# Patient Record
Sex: FEMALE | Race: OTHER | HISPANIC OR LATINO
[De-identification: names, ages, dates, MRNs, and addresses within clinical notes are randomized per-mention and may not be internally consistent; named-entity substitution may affect disease eponyms.]

---

## 2017-03-06 ENCOUNTER — APPOINTMENT (OUTPATIENT)
Dept: ORTHOPEDIC SURGERY | Facility: CLINIC | Age: 52
End: 2017-03-06

## 2017-03-06 VITALS — WEIGHT: 200 LBS | HEIGHT: 67 IN | RESPIRATION RATE: 16 BRPM | BODY MASS INDEX: 31.39 KG/M2

## 2017-03-06 DIAGNOSIS — M25.50 PAIN IN UNSPECIFIED JOINT: ICD-10-CM

## 2017-03-06 DIAGNOSIS — Z87.898 PERSONAL HISTORY OF OTHER SPECIFIED CONDITIONS: ICD-10-CM

## 2017-03-06 DIAGNOSIS — K31.9 DISEASE OF STOMACH AND DUODENUM, UNSPECIFIED: ICD-10-CM

## 2017-03-06 DIAGNOSIS — Z87.39 PERSONAL HISTORY OF OTHER DISEASES OF THE MUSCULOSKELETAL SYSTEM AND CONNECTIVE TISSUE: ICD-10-CM

## 2017-03-06 DIAGNOSIS — R63.1 POLYDIPSIA: ICD-10-CM

## 2017-03-06 DIAGNOSIS — G56.02 CARPAL TUNNEL SYNDROME, LEFT UPPER LIMB: ICD-10-CM

## 2017-03-06 DIAGNOSIS — M19.90 UNSPECIFIED OSTEOARTHRITIS, UNSPECIFIED SITE: ICD-10-CM

## 2017-03-06 DIAGNOSIS — R20.0 ANESTHESIA OF SKIN: ICD-10-CM

## 2017-03-06 DIAGNOSIS — Z87.891 PERSONAL HISTORY OF NICOTINE DEPENDENCE: ICD-10-CM

## 2017-03-06 RX ORDER — NAPROXEN 500 MG/1
500 TABLET ORAL
Refills: 0 | Status: ACTIVE | COMMUNITY

## 2017-03-16 ENCOUNTER — APPOINTMENT (OUTPATIENT)
Dept: ORTHOPEDIC SURGERY | Facility: AMBULATORY SURGERY CENTER | Age: 52
End: 2017-03-16

## 2017-04-19 ENCOUNTER — APPOINTMENT (OUTPATIENT)
Dept: BARIATRICS | Facility: CLINIC | Age: 52
End: 2017-04-19

## 2017-04-25 RX ORDER — OXYCODONE AND ACETAMINOPHEN 5; 325 MG/1; MG/1
5-325 TABLET ORAL
Qty: 40 | Refills: 0 | Status: ACTIVE | COMMUNITY
Start: 2017-04-25 | End: 1900-01-01

## 2017-04-26 ENCOUNTER — APPOINTMENT (OUTPATIENT)
Dept: BARIATRICS | Facility: CLINIC | Age: 52
End: 2017-04-26

## 2017-04-26 ENCOUNTER — APPOINTMENT (OUTPATIENT)
Dept: ORTHOPEDIC SURGERY | Facility: AMBULATORY SURGERY CENTER | Age: 52
End: 2017-04-26

## 2017-05-03 RX ORDER — OXYCODONE AND ACETAMINOPHEN 5; 325 MG/1; MG/1
5-325 TABLET ORAL
Qty: 40 | Refills: 0 | Status: ACTIVE | COMMUNITY
Start: 2017-05-03 | End: 1900-01-01

## 2017-05-04 ENCOUNTER — APPOINTMENT (OUTPATIENT)
Dept: ORTHOPEDIC SURGERY | Facility: AMBULATORY SURGERY CENTER | Age: 52
End: 2017-05-04

## 2017-05-04 ENCOUNTER — OUTPATIENT (OUTPATIENT)
Dept: OUTPATIENT SERVICES | Facility: HOSPITAL | Age: 52
LOS: 1 days | End: 2017-05-04

## 2017-05-04 DIAGNOSIS — Z90.49 ACQUIRED ABSENCE OF OTHER SPECIFIED PARTS OF DIGESTIVE TRACT: Chronic | ICD-10-CM

## 2017-05-04 DIAGNOSIS — Z98.1 ARTHRODESIS STATUS: Chronic | ICD-10-CM

## 2017-08-10 ENCOUNTER — APPOINTMENT (OUTPATIENT)
Dept: GASTROENTEROLOGY | Facility: CLINIC | Age: 52
End: 2017-08-10

## 2018-03-21 ENCOUNTER — EMERGENCY (EMERGENCY)
Facility: HOSPITAL | Age: 53
LOS: 1 days | Discharge: HOME | End: 2018-03-21
Attending: EMERGENCY MEDICINE | Admitting: EMERGENCY MEDICINE
Payer: COMMERCIAL

## 2018-03-21 VITALS
SYSTOLIC BLOOD PRESSURE: 115 MMHG | DIASTOLIC BLOOD PRESSURE: 79 MMHG | HEART RATE: 75 BPM | OXYGEN SATURATION: 97 % | RESPIRATION RATE: 18 BRPM | WEIGHT: 203.93 LBS | TEMPERATURE: 97 F

## 2018-03-21 DIAGNOSIS — Z79.891 LONG TERM (CURRENT) USE OF OPIATE ANALGESIC: ICD-10-CM

## 2018-03-21 DIAGNOSIS — R07.9 CHEST PAIN, UNSPECIFIED: ICD-10-CM

## 2018-03-21 DIAGNOSIS — Z90.49 ACQUIRED ABSENCE OF OTHER SPECIFIED PARTS OF DIGESTIVE TRACT: Chronic | ICD-10-CM

## 2018-03-21 DIAGNOSIS — M79.601 PAIN IN RIGHT ARM: ICD-10-CM

## 2018-03-21 DIAGNOSIS — Z79.2 LONG TERM (CURRENT) USE OF ANTIBIOTICS: ICD-10-CM

## 2018-03-21 DIAGNOSIS — M79.1 MYALGIA: ICD-10-CM

## 2018-03-21 DIAGNOSIS — Z98.1 ARTHRODESIS STATUS: Chronic | ICD-10-CM

## 2018-03-21 DIAGNOSIS — M79.602 PAIN IN LEFT ARM: ICD-10-CM

## 2018-03-21 PROCEDURE — 93005 ELECTROCARDIOGRAM TRACING: CPT

## 2018-03-21 PROCEDURE — 93010 ELECTROCARDIOGRAM REPORT: CPT

## 2018-03-21 PROCEDURE — 99283 EMERGENCY DEPT VISIT LOW MDM: CPT | Mod: 25

## 2018-03-21 PROCEDURE — 71046 X-RAY EXAM CHEST 2 VIEWS: CPT

## 2018-03-21 PROCEDURE — 71046 X-RAY EXAM CHEST 2 VIEWS: CPT | Mod: 26

## 2018-03-21 PROCEDURE — 99284 EMERGENCY DEPT VISIT MOD MDM: CPT | Mod: 25

## 2018-03-21 NOTE — ED PROVIDER NOTE - PHYSICAL EXAMINATION
reflexes intact bilateral UE no swelling skin intact , compartments soft + moves extremity upon command pulses 2+ regular RUM nerve intact M/S cap refill brisk skin warm and pink + able to pendulum at shoulders + noted tenderness with biceps use especially

## 2018-03-21 NOTE — ED PROVIDER NOTE - ATTENDING CONTRIBUTION TO CARE
pt visualized by me, key points of case gideon MCCARTY.  53 yo female co one month of bilateral bicep pain. has seen her pmd and has appt with rheum.  no acute findings here.  patient will fu outpatient.

## 2018-03-21 NOTE — ED PROVIDER NOTE - OBJECTIVE STATEMENT
Patient with at least one month bilateral bicep region tenderness and radiation throughout musculature extremities . Denies Cardiac HX + seen by PMD and given NSAID and Neurontin and muscle relaxant though denies much relief Has Rheum upcoming Appt in April. No fever + pain increased with movement and decreased with relaxing Patient admits to caring for 2 year old with 3 story home and admits to lots of lifting

## 2018-03-21 NOTE — ED ADULT TRIAGE NOTE - ARRIVAL INFO ADDITIONAL COMMENTS
pt has had bilateral bicep pain for 1 month that now radiates across her chest and upper back.  seen by PMD who treated her for arthritis.  pt states pain is no better and at the end of the day she can not lift her arms up due to pain.  no sob.  no dizziness.  no n/v.

## 2018-03-21 NOTE — ED ADULT NURSE NOTE - CHPI ED SYMPTOMS NEG
no shortness of breath/no dizziness/no vomiting/no syncope/no fever/no chest pain/no cough/no nausea/no chills/no diaphoresis/no back pain

## 2018-03-21 NOTE — ED PROVIDER NOTE - MEDICAL DECISION MAKING DETAILS
patient with extremity use appreciated primarily and discussed overuse syndromes as well as Rheum appt in place -> no overt emergent process identified warranting further ED work up

## 2018-03-21 NOTE — ED ADULT NURSE NOTE - OBJECTIVE STATEMENT
The patient is a 51 y/o female who came in to ED c/o bilateral arm pain and chest soreness. Pt states that she went to her PMD and was given meloxicam and a muscle relaxant without any relief. Pt was told she has arthritis. Pt reports spinal fusion surgery as well. Pt is aox4, denies any dizziness, nausea or shortness of breath. 12 lead EKG done.

## 2019-10-17 NOTE — ED ADULT TRIAGE NOTE - NS ED TRIAGE AVPU SCALE
Outpatient Medications Marked as Taking for the 10/17/19 encounter (Refill) with Francisca Bower MD   Medication Sig Dispense Refill   • [START ON 10/18/2019] dexmethylpheniDATE (FOCALIN) 5 MG tablet Take 1 tablet by mouth 2 times daily. Do not start before October 18, 2019. 60 tablet 0   • dexmethylpheniDATE (FOCALIN) 5 MG tablet Take 1 tablet by mouth 2 times daily. 60 tablet 0           [facility]  
Refill request for focalin   Last office visit: 09/20/2019  Last refill: This was future filled. Set to release tomorrow.   
Alert-The patient is alert, awake and responds to voice. The patient is oriented to time, place, and person. The triage nurse is able to obtain subjective information.

## 2022-03-03 ENCOUNTER — OFFICE VISIT (OUTPATIENT)
Dept: INTERNAL MEDICINE CLINIC | Facility: CLINIC | Age: 57
End: 2022-03-03
Payer: COMMERCIAL

## 2022-03-03 VITALS
HEIGHT: 67 IN | OXYGEN SATURATION: 99 % | BODY MASS INDEX: 32.33 KG/M2 | HEART RATE: 68 BPM | WEIGHT: 206 LBS | DIASTOLIC BLOOD PRESSURE: 84 MMHG | SYSTOLIC BLOOD PRESSURE: 116 MMHG

## 2022-03-03 DIAGNOSIS — Z13.9 SCREENING DUE: ICD-10-CM

## 2022-03-03 DIAGNOSIS — E78.5 HYPERLIPIDEMIA, UNSPECIFIED HYPERLIPIDEMIA TYPE: ICD-10-CM

## 2022-03-03 DIAGNOSIS — R73.03 PREDIABETES: ICD-10-CM

## 2022-03-03 DIAGNOSIS — Z00.00 ENCOUNTER FOR SUBSEQUENT ANNUAL WELLNESS VISIT (AWV) IN MEDICARE PATIENT: ICD-10-CM

## 2022-03-03 DIAGNOSIS — M79.7 FIBROMYALGIA: Primary | ICD-10-CM

## 2022-03-03 DIAGNOSIS — N76.4 BOIL OF VULVA: ICD-10-CM

## 2022-03-03 PROCEDURE — G0439 PPPS, SUBSEQ VISIT: HCPCS | Performed by: FAMILY MEDICINE

## 2022-03-03 PROCEDURE — 99204 OFFICE O/P NEW MOD 45 MIN: CPT | Performed by: FAMILY MEDICINE

## 2022-03-03 RX ORDER — DULOXETIN HYDROCHLORIDE 30 MG/1
30 CAPSULE, DELAYED RELEASE ORAL DAILY
COMMUNITY
End: 2022-03-03 | Stop reason: SDUPTHER

## 2022-03-03 RX ORDER — SULFAMETHOXAZOLE AND TRIMETHOPRIM 800; 160 MG/1; MG/1
1 TABLET ORAL 2 TIMES DAILY
Qty: 6 TABLET | Refills: 0 | Status: SHIPPED | OUTPATIENT
Start: 2022-03-03 | End: 2022-03-06

## 2022-03-03 RX ORDER — DULOXETIN HYDROCHLORIDE 60 MG/1
60 CAPSULE, DELAYED RELEASE ORAL DAILY
Qty: 60 CAPSULE | Refills: 0 | Status: SHIPPED | OUTPATIENT
Start: 2022-03-03 | End: 2022-04-29

## 2022-03-03 NOTE — PROGRESS NOTES
Assessment and Plan:     Problem List Items Addressed This Visit        Other    Prediabetes    Relevant Orders    HEMOGLOBIN A1C W/ EAG ESTIMATION    Ambulatory Referral to Weight Management    Hyperlipidemia    Relevant Orders    Lipid Panel with Direct LDL reflex    Fibromyalgia - Primary    Relevant Medications    DULoxetine (CYMBALTA) 60 mg delayed release capsule    BMI 32 0-32 9,adult    Relevant Orders    Ambulatory Referral to Weight Management      Other Visit Diagnoses     Screening due        Relevant Orders    Comprehensive metabolic panel    TSH, 3rd generation with Free T4 reflex    Boil of vulva        Relevant Medications    sulfamethoxazole-trimethoprim (BACTRIM DS) 800-160 mg per tablet    Encounter for subsequent annual wellness visit (AWV) in Medicare patient               Preventive health issues were discussed with patient, and age appropriate screening tests were ordered as noted in patient's After Visit Summary  Personalized health advice and appropriate referrals for health education or preventive services given if needed, as noted in patient's After Visit Summary       History of Present Illness:     Patient presents for Medicare Annual Wellness visit    Patient Care Team:  Opal Mejia DO as PCP - General (Family Medicine)  Opal Mejia DO as PCP - [address] [place], [place] [address]Th [facility] ([facility])     Problem List:     Patient Active Problem List   Diagnosis    Prediabetes    Hyperlipidemia    Fibromyalgia    BMI 32 0-32 9,adult      Past Medical and Surgical History:     Past Medical History:   Diagnosis Date    Degenerative disc disease, lumbar     Diverticulitis of intestine     Fibromyalgia      Past Surgical History:   Procedure Laterality Date    FINGER SURGERY      KNEE SURGERY      SPINAL FUSION        Family History:     Family History   Problem Relation Age of Onset    Cancer Mother     Diabetes Father     Cancer Brother       Social History:     Social History Socioeconomic History    Marital status: /Civil Union     Spouse name: None    Number of children: None    Years of education: None    Highest education level: None   Occupational History    None   Tobacco Use    Smoking status: Former Smoker     Types: Cigarettes     Quit date:      Years since quittin 1    Smokeless tobacco: Never Used   Vaping Use    Vaping Use: Some days    Substances: Nicotine   Substance and Sexual Activity    Alcohol use: Yes     Comment: social     Drug use: Never    Sexual activity: None   Other Topics Concern    None   Social History Narrative    None     Social Determinants of Health     Financial Resource Strain: Not on file   Food Insecurity: Not on file   Transportation Needs: Not on file   Physical Activity: Not on file   Stress: Not on file   Social Connections: Not on file   Intimate Partner Violence: Not on file   Housing Stability: Not on file      Medications and Allergies:     Current Outpatient Medications   Medication Sig Dispense Refill    DULoxetine (CYMBALTA) 60 mg delayed release capsule Take 1 capsule (60 mg total) by mouth daily 60 capsule 0    sulfamethoxazole-trimethoprim (BACTRIM DS) 800-160 mg per tablet Take 1 tablet by mouth 2 (two) times a day for 3 days 6 tablet 0     No current facility-administered medications for this visit       No Known Allergies   Immunizations:     Immunization History   Administered Date(s) Administered    COVID-19 MODERNA VACC 0 5 ML IM 2021, 2021    COVID-19 PFIZER VACCINE 0 3 ML IM 2021      Health Maintenance:         Topic Date Due    Hepatitis C Screening  Never done    HIV Screening  Never done    Cervical Cancer Screening  Never done    Breast Cancer Screening: Mammogram  Never done    Colorectal Cancer Screening  Never done         Topic Date Due    DTaP,Tdap,and Td Vaccines (1 - Tdap) Never done    Influenza Vaccine (1) Never done      Medicare Health Risk Assessment: /84 (BP Location: Left arm, Patient Position: Sitting, Cuff Size: Standard)   Pulse 68   Ht 5' 7" (1 702 m)   Wt 93 4 kg (206 lb)   SpO2 99%   BMI 32 26 kg/m²      Lupis is here for her Subsequent Wellness visit  Health Risk Assessment:   Patient rates overall health as good  Patient feels that their physical health rating is same  Patient is satisfied with their life  Eyesight was rated as same  Hearing was rated as same  Patient feels that their emotional and mental health rating is same  Patients states they are never, rarely angry  Patient states they are always unusually tired/fatigued  Pain experienced in the last 7 days has been a lot  Patient's pain rating has been 4/10  Patient states that she has experienced no weight loss or gain in last 6 months  Depression Screening:   PHQ-2 Score: 0      Fall Risk Screening: In the past year, patient has experienced: no history of falling in past year      Urinary Incontinence Screening:   Patient has not leaked urine accidently in the last six months  Home Safety:  Patient does not have trouble with stairs inside or outside of their home  Patient has working smoke alarms and has working carbon monoxide detector  Home safety hazards include: none  Nutrition:   Current diet is Regular  Medications:   Patient is not currently taking any over-the-counter supplements  Patient is able to manage medications  Activities of Daily Living (ADLs)/Instrumental Activities of Daily Living (IADLs):   Walk and transfer into and out of bed and chair?: Yes  Dress and groom yourself?: Yes    Bathe or shower yourself?: Yes    Feed yourself?  Yes  Do your laundry/housekeeping?: Yes  Manage your money, pay your bills and track your expenses?: Yes  Make your own meals?: Yes    Do your own shopping?: Yes    Previous Hospitalizations:   Any hospitalizations or ED visits within the last 12 months?: No      Advance Care Planning:   Living will: No End of Life Decisions reviewed with patient: Yes      PREVENTIVE SCREENINGS      Cardiovascular Screening:    General: Screening Not Indicated and History Lipid Disorder      Lung Cancer Screening:     General: Screening Not Indicated    Screening, Brief Intervention, and Referral to Treatment (SBIRT)    Screening    Typical number of drinks in a week: 8    Single Item Drug Screening:  How often have you used an illegal drug (including marijuana) or a prescription medication for non-medical reasons in the past year? never    Single Item Drug Screen Score: 0  Interpretation: Negative screen for possible drug use disorder      Jalen Padilla, DO

## 2022-03-03 NOTE — PATIENT INSTRUCTIONS
Start taking cymbalta 60mg once a day for 2 weeks  If no improvement go to 60mg twice a day  Take the antibiotic and do warm compress for the boil     Make sure to follow up with weight loss management

## 2022-03-03 NOTE — PROGRESS NOTES
INITIAL OFFICE VISIT  [facility] [address] [place] [address]    NAME: Desi Jaimes  AGE: 49 y o  SEX: female  : 1965     DATE: 3/3/2022     Assessment and Plan:     1  Prediabetes-history of per pt  Last a1c over 6 months ago  Will obtain an UTD study    - HEMOGLOBIN A1C W/ EAG ESTIMATION; Future  - Ambulatory Referral to Weight Management; Future    2  Hyperlipidemia, unspecified hyperlipidemia type-diet controlled per patient  Will continue to monitor    - Lipid Panel with Direct LDL reflex; Future    3  Screening due  - Comprehensive metabolic panel; Future  - TSH, 3rd generation with Free T4 reflex; Future    4  Fibromyalgia-longstanding hx  Worsening over past few months  Has been on Cymbalta 30mg qd monotherapy for years  Reports hx of unremarkable rheum work up  No hx with pain management  Will increase cymbalta to 60mg qd  If in 2 weeks no improvement pt to titrate to BID dosing  Could consider lyrica therapy if pian is refractory to max dose of Cymbalta  - DULoxetine (CYMBALTA) 60 mg delayed release capsule; Take 1 capsule (60 mg total) by mouth daily  Dispense: 60 capsule; Refill: 0    5  BMI 32 0-32 9,adult  - Ambulatory Referral to Weight Management; Future    6  Boil of vulva- small, non tender  No significant surrounding erythema  Pt to use warm compress and abx    - sulfamethoxazole-trimethoprim (BACTRIM DS) 800-160 mg per tablet; Take 1 tablet by mouth 2 (two) times a day for 3 days  Dispense: 6 tablet; Refill: 0    No follow-ups on file  Chief Complaint:     Chief Complaint   Patient presents with    New Patient Visit      History of Present Illness:     Presents to establish care  Hx includes fibromyalgia, prediabetres and hld  C/o pain  Very sharp  Constant  throhgout uppre body and arms  Has been going on for years  Was managemed with cymbalta 30 until septemer  Went to the ER in December du to this pain  Work up was normal per pt   Reports hx of negative rheumatoligic work up orpir to [address] over 4 years ago  Denies trial of any other threpaies besides cymbatlat for her fibro  Mom had dm2  Dad htn and an MI in his 45s  Mom had breast cancer at 52  No colon cancer in family  No personal hx of MI OR CVA     Pt is scheduled for a mammo this year  Had a colonoscpy with dr Michael Chacko recently  C/o of a lump on her vulva  present for a long time  Using topical ointment with no improvement  The following portions of the patient's history were reviewed and updated as appropriate: allergies, current medications, past family history, past medical history, past social history, past surgical history and problem list      Review of Systems:     Review of Systems   Constitutional: Positive for fatigue  HENT: Negative for hearing loss  Eyes: Negative for visual disturbance  Respiratory: Negative for shortness of breath  Cardiovascular: Negative for palpitations  Gastrointestinal: Positive for constipation  Negative for diarrhea  Musculoskeletal: Positive for myalgias  Negative for arthralgias and gait problem  Neurological: Negative for headaches          Past Medical History:     Past Medical History:   Diagnosis Date    Degenerative disc disease, lumbar     Diverticulitis of intestine     Fibromyalgia         Past Surgical History:     Past Surgical History:   Procedure Laterality Date    FINGER SURGERY      KNEE SURGERY      SPINAL FUSION          Social History:     Social History     Socioeconomic History    Marital status: /Civil Union     Spouse name: None    Number of children: None    Years of education: None    Highest education level: None   Occupational History    None   Tobacco Use    Smoking status: Former Smoker     Types: Cigarettes     Quit date:      Years since quittin 1    Smokeless tobacco: Never Used   Vaping Use    Vaping Use: Some days    Substances: Nicotine Substance and Sexual Activity    Alcohol use: Yes     Comment: social     Drug use: Never    Sexual activity: None   Other Topics Concern    None   Social History Narrative    None     Social Determinants of Health     Financial Resource Strain: Not on file   Food Insecurity: Not on file   Transportation Needs: Not on file   Physical Activity: Not on file   Stress: Not on file   Social Connections: Not on file   Intimate Partner Violence: Not on file   Housing Stability: Not on file         Family History:     Family History   Problem Relation Age of Onset    Cancer Mother     Diabetes Father     Cancer Brother         Current Medications:     Current Outpatient Medications:     DULoxetine (CYMBALTA) 30 mg delayed release capsule, Take 30 mg by mouth daily, Disp: , Rfl:      Allergies:   No Known Allergies     Physical Exam:     There were no vitals taken for this visit  Physical Exam  HENT:      Head: Normocephalic and atraumatic  Right Ear: Tympanic membrane and external ear normal       Left Ear: Tympanic membrane and external ear normal    Eyes:      Extraocular Movements: Extraocular movements intact  Conjunctiva/sclera: Conjunctivae normal       Pupils: Pupils are equal, round, and reactive to light  Cardiovascular:      Rate and Rhythm: Normal rate and regular rhythm  Heart sounds: No murmur heard  Pulmonary:      Effort: Pulmonary effort is normal       Breath sounds: Normal breath sounds  Abdominal:      General: Bowel sounds are normal       Palpations: Abdomen is soft  Genitourinary:         Comments: Small, minimally tender area of induration  No significant erythema or fluctuation  Neurological:      Mental Status: She is alert and oriented to person, place, and time        Gait: Gait normal            Data:     Laboratory Results: I have personally reviewed the pertinent laboratory results/reports   Radiology/Other Diagnostic Testing Results: I have personally reviewed pertinent reports        Domonique Whiteside DO  [facility]

## 2022-03-08 ENCOUNTER — APPOINTMENT (OUTPATIENT)
Dept: LAB | Facility: CLINIC | Age: 57
End: 2022-03-08
Payer: COMMERCIAL

## 2022-03-08 DIAGNOSIS — R73.03 PREDIABETES: ICD-10-CM

## 2022-03-08 DIAGNOSIS — Z13.9 SCREENING DUE: ICD-10-CM

## 2022-03-08 DIAGNOSIS — E78.5 HYPERLIPIDEMIA, UNSPECIFIED HYPERLIPIDEMIA TYPE: ICD-10-CM

## 2022-03-08 LAB
ALBUMIN SERPL BCP-MCNC: 4 G/DL (ref 3.5–5)
ALP SERPL-CCNC: 92 U/L (ref 46–116)
ALT SERPL W P-5'-P-CCNC: 33 U/L (ref 12–78)
ANION GAP SERPL CALCULATED.3IONS-SCNC: 5 MMOL/L (ref 4–13)
AST SERPL W P-5'-P-CCNC: 21 U/L (ref 5–45)
BILIRUB SERPL-MCNC: 0.44 MG/DL (ref 0.2–1)
BUN SERPL-MCNC: 12 MG/DL (ref 5–25)
CALCIUM SERPL-MCNC: 9.6 MG/DL (ref 8.3–10.1)
CHLORIDE SERPL-SCNC: 105 MMOL/L (ref 100–108)
CHOLEST SERPL-MCNC: 277 MG/DL
CO2 SERPL-SCNC: 27 MMOL/L (ref 21–32)
CREAT SERPL-MCNC: 0.68 MG/DL (ref 0.6–1.3)
EST. AVERAGE GLUCOSE BLD GHB EST-MCNC: 128 MG/DL
GFR SERPL CREATININE-BSD FRML MDRD: 99 ML/MIN/1.73SQ M
GLUCOSE P FAST SERPL-MCNC: 139 MG/DL (ref 65–99)
HBA1C MFR BLD: 6.1 %
HDLC SERPL-MCNC: 48 MG/DL
LDLC SERPL DIRECT ASSAY-MCNC: 154 MG/DL (ref 0–100)
POTASSIUM SERPL-SCNC: 3.9 MMOL/L (ref 3.5–5.3)
PROT SERPL-MCNC: 8.6 G/DL (ref 6.4–8.2)
SODIUM SERPL-SCNC: 137 MMOL/L (ref 136–145)
TRIGL SERPL-MCNC: 477 MG/DL
TSH SERPL DL<=0.05 MIU/L-ACNC: 1.45 UIU/ML (ref 0.36–3.74)

## 2022-03-08 PROCEDURE — 80053 COMPREHEN METABOLIC PANEL: CPT

## 2022-03-08 PROCEDURE — 80061 LIPID PANEL: CPT

## 2022-03-08 PROCEDURE — 36415 COLL VENOUS BLD VENIPUNCTURE: CPT

## 2022-03-08 PROCEDURE — 83721 ASSAY OF BLOOD LIPOPROTEIN: CPT

## 2022-03-08 PROCEDURE — 83036 HEMOGLOBIN GLYCOSYLATED A1C: CPT

## 2022-03-08 PROCEDURE — 84443 ASSAY THYROID STIM HORMONE: CPT

## 2022-03-09 ENCOUNTER — TELEPHONE (OUTPATIENT)
Dept: INTERNAL MEDICINE CLINIC | Facility: CLINIC | Age: 57
End: 2022-03-09

## 2022-03-09 NOTE — TELEPHONE ENCOUNTER
----- Message from Jaimee Shaw DO sent at 3/8/2022 10:34 PM EST -----  Please notify pt that her cholesterol and blood sugar are very high  She does have pre diabetes with an a1c of 6 1  She doesn't need medication  at this time  Recommend rechecking level in 3 months while she work on diet and exercise  If no improvement would recommend medication at that time

## 2022-03-29 ENCOUNTER — OFFICE VISIT (OUTPATIENT)
Dept: INTERNAL MEDICINE CLINIC | Facility: CLINIC | Age: 57
End: 2022-03-29
Payer: COMMERCIAL

## 2022-03-29 VITALS
WEIGHT: 206.2 LBS | OXYGEN SATURATION: 96 % | RESPIRATION RATE: 18 BRPM | HEIGHT: 67 IN | SYSTOLIC BLOOD PRESSURE: 128 MMHG | DIASTOLIC BLOOD PRESSURE: 84 MMHG | TEMPERATURE: 97.5 F | HEART RATE: 86 BPM | BODY MASS INDEX: 32.36 KG/M2

## 2022-03-29 DIAGNOSIS — R05.9 COUGH: ICD-10-CM

## 2022-03-29 DIAGNOSIS — B34.9 VIRAL ILLNESS: ICD-10-CM

## 2022-03-29 DIAGNOSIS — H65.03 NON-RECURRENT ACUTE SEROUS OTITIS MEDIA OF BOTH EARS: ICD-10-CM

## 2022-03-29 LAB
SARS-COV-2 AG UPPER RESP QL IA: NEGATIVE
VALID CONTROL: NORMAL

## 2022-03-29 PROCEDURE — 87811 SARS-COV-2 COVID19 W/OPTIC: CPT | Performed by: NURSE PRACTITIONER

## 2022-03-29 PROCEDURE — 99214 OFFICE O/P EST MOD 30 MIN: CPT | Performed by: NURSE PRACTITIONER

## 2022-03-29 RX ORDER — AMOXICILLIN AND CLAVULANATE POTASSIUM 875; 125 MG/1; MG/1
1 TABLET, FILM COATED ORAL EVERY 12 HOURS SCHEDULED
Qty: 20 TABLET | Refills: 0 | Status: SHIPPED | OUTPATIENT
Start: 2022-03-29 | End: 2022-04-08

## 2022-03-29 RX ORDER — BENZONATATE 100 MG/1
100 CAPSULE ORAL 3 TIMES DAILY PRN
Qty: 20 CAPSULE | Refills: 0 | Status: SHIPPED | OUTPATIENT
Start: 2022-03-29 | End: 2022-06-23

## 2022-03-29 RX ORDER — PROMETHAZINE HYDROCHLORIDE AND CODEINE PHOSPHATE 6.25; 1 MG/5ML; MG/5ML
5 SYRUP ORAL EVERY 4 HOURS PRN
Qty: 120 ML | Refills: 0 | Status: SHIPPED | OUTPATIENT
Start: 2022-03-29 | End: 2022-06-23

## 2022-03-29 NOTE — PROGRESS NOTES
[facility]    NAME: Juanita Tobar  AGE: 49 y o  SEX: female  : 1965     DATE: 3/29/2022     Assessment and Plan:     Problem List Items Addressed This Visit        Nervous and Auditory    Non-recurrent acute serous otitis media of both ears       The patient started with symptoms on Saturday of nasal congestion, bilateral ear pain, sore throat and clear nasal drainage  She denies any fevers  Over-the-counter medications such as Tylenol, Flonase and NyQuil will use by the patient but did not relieve her symptoms  Her granddaughter was also sick last week and tested negative for COVID, flu and RSV  The patient does look ill in the office today  She is also complaining of tenderness in the area of her thyroid and sinus pain and pressure  Upon exam the patient does have injected  Tympanic membranes bilaterally as well as the ear effusions and tenderness  I will treat the patient for an ear infection  Augmentin was sent to the patient's pharmacy  Over-the-counter medications such as Mucinex and Robitussin were recommended for the patient's other symptoms  In addition Tessalon Perles and Phenergan with codeine was sent to the patient's pharmacy  Side effects of the Augmentin were discussed with the patient    She has been recommended to take a probiotic  while on these antibiotics as well as to take them with food         Relevant Medications    amoxicillin-clavulanate (Augmentin) 875-125 mg per tablet    promethazine-codeine (PHENERGAN WITH CODEINE) 6 25-10 mg/5 mL syrup      Other Visit Diagnoses     Cough        Relevant Medications    promethazine-codeine (PHENERGAN WITH CODEINE) 6 25-10 mg/5 mL syrup    benzonatate (TESSALON PERLES) 100 mg capsule    Viral illness        Relevant Medications    amoxicillin-clavulanate (Augmentin) 875-125 mg per tablet    promethazine-codeine (PHENERGAN WITH CODEINE) 6 25-10 mg/5 mL syrup    Other Relevant Orders    POCT Rapid Covid               No follow-ups on file  Chief Complaint:     Chief Complaint   Patient presents with    Follow-up     congestion, ears hurt, sore throat, sinusitis        History of Present Illness:    patient presents to the office today with a 3 day history of nasal congestion, clear nasal drainage, sore throat, ear pain  This is all discussed in the above assessment and plan  She will be treated for bilateral ear infection  Review of Systems:     Review of Systems   Constitutional: Positive for fatigue  Negative for activity change and fever  HENT: Positive for congestion, ear pain, postnasal drip, rhinorrhea, sinus pressure, sinus pain and sore throat  Negative for hearing loss, trouble swallowing and voice change  Eyes: Negative for photophobia, pain, discharge and visual disturbance  Respiratory: Positive for cough  Negative for chest tightness and shortness of breath  Cardiovascular: Negative for chest pain, palpitations and leg swelling  Gastrointestinal: Negative for abdominal pain, blood in stool, constipation, nausea and vomiting  Endocrine: Negative for cold intolerance and heat intolerance  Genitourinary: Negative for difficulty urinating, frequency, hematuria, urgency, vaginal bleeding and vaginal discharge  Musculoskeletal: Negative for arthralgias and myalgias  Skin: Negative  Neurological: Negative for dizziness, weakness, numbness and headaches  Psychiatric/Behavioral: Negative for decreased concentration  The patient is not nervous/anxious           Problem List:     Patient Active Problem List   Diagnosis    Prediabetes    Hyperlipidemia    Fibromyalgia    BMI 32 0-32 9,adult    Non-recurrent acute serous otitis media of both ears        Objective:     /84 (BP Location: Left arm, Patient Position: Sitting, Cuff Size: Standard)   Pulse 86   Temp 97 5 °F (36 4 °C) (Temporal) Comment: no nsaids  Resp 18   Ht 5' 7" (9 705 m)   Wt 93 5 kg (206 lb 3 2 oz)   SpO2 96%   BMI 32 30 kg/m²     Current Outpatient Medications   Medication Instructions    ACETAMINOPHEN  mg, Oral, Every 6 hours PRN    amoxicillin-clavulanate (Augmentin) 875-125 mg per tablet 1 tablet, Oral, Every 12 hours scheduled    benzonatate (TESSALON PERLES) 100 mg, Oral, 3 times daily PRN    DULoxetine (CYMBALTA) 60 mg, Oral, Daily    promethazine-codeine (PHENERGAN WITH CODEINE) 6 25-10 mg/5 mL syrup 5 mL, Oral, Every 4 hours PRN    Pseudoeph-Doxylamine-DM-APAP (NYQUIL PO) Oral, As needed         Physical Exam  Vitals reviewed  Constitutional:       Appearance: Normal appearance  She is obese  She is ill-appearing  HENT:      Head: Normocephalic  Right Ear: External ear normal  Tenderness present  A middle ear effusion is present  Tympanic membrane is injected and erythematous  Left Ear: External ear normal  Tenderness present  A middle ear effusion is present  Tympanic membrane is injected and erythematous  Nose: Mucosal edema, congestion and rhinorrhea present  Right Sinus: Maxillary sinus tenderness and frontal sinus tenderness present  Left Sinus: Maxillary sinus tenderness and frontal sinus tenderness present  Mouth/Throat:      Mouth: Mucous membranes are moist       Pharynx: Oropharynx is clear  No pharyngeal swelling, oropharyngeal exudate or posterior oropharyngeal erythema  Tonsils: No tonsillar exudate  1+ on the right  1+ on the left  Eyes:      Extraocular Movements: Extraocular movements intact  Pupils: Pupils are equal, round, and reactive to light  Cardiovascular:      Rate and Rhythm: Normal rate and regular rhythm  Pulses: Normal pulses  Heart sounds: Normal heart sounds  Pulmonary:      Effort: Pulmonary effort is normal       Breath sounds: Normal breath sounds  Musculoskeletal:         General: Normal range of motion  Skin:     General: Skin is warm and dry  Neurological:      General: No focal deficit present  Mental Status: She is alert and oriented to person, place, and time  Psychiatric:         Mood and Affect: Mood normal          Behavior: Behavior normal          Thought Content:  Thought content normal          Judgment: Judgment normal          [facility] named [facility], [address] [facility]

## 2022-03-29 NOTE — PATIENT INSTRUCTIONS
May take over the counter multi symptom medication such Mucinex, Robitussin  Probiotic while on antibiotic, take with food     Otitis Media, Ambulatory Care   GENERAL INFORMATION:   Otitis media  is an ear infection  Common symptoms include the following:   · Fever or a headache    · Ear pain    · Trouble hearing    · Ear feels plugged or full or you have ringing or buzzing in your ear    · Dizziness or you lose your balance    · Nausea or vomiting  Seek immediate care for the following symptoms:   · Seizure    · Fever and a stiff neck  Treatment for otitis media  may include any of the following:  · NSAIDs  help decrease swelling and pain or fever  This medicine is available with or without a doctor's order  NSAIDs can cause stomach bleeding or kidney problems in certain people  If you take blood thinner medicine, always ask your healthcare provider if NSAIDs are safe for you  Always read the medicine label and follow directions  · Ear drops  to help treat your ear pain  · Antibiotics  to help kill the germs that caused your ear infection  Care for otitis media:   · Use heat  Place a warm, moist washcloth on your ear to decrease pain  Apply for 15 to 20 minutes, 3 to 4 times a day    · Use ice  Ice helps decrease swelling and pain  Use an ice pack or put crushed ice in a plastic bag  Cover the ice pack with a towel and place it on your ear for 15 to 20 minutes, 3 to 4 times a day for 2 days  Prevent otitis media:   · Wash your hands often  This will help prevent the spread of germs  Encourage everyone in your house to wash their hands with soap and water after they use the bathroom  Everyone should also wash their hands after they change a child's diaper and before they prepare or eat food  · Stay away from people who are ill  Germs are easily and quickly spread through contact    Follow up with your healthcare provider as directed:  Write down your questions so you remember to ask them during your visits  CARE AGREEMENT:   You have the right to help plan your care  Learn about your health condition and how it may be treated  Discuss treatment options with your caregivers to decide what care you want to receive  You always have the right to refuse treatment  The above information is an  only  It is not intended as medical advice for individual conditions or treatments  Talk to your doctor, nurse or pharmacist before following any medical regimen to see if it is safe and effective for you  © 2014 [address] is for End User's use only and may not be sold, redistributed or otherwise used for commercial purposes  All illustrations and images included in CareNotes® are the copyrighted property of A D A M , Inc  or [facility]  Sinusitis   AMBULATORY CARE:   Sinusitis  is inflammation or infection of your sinuses  Sinusitis is most often caused by a virus  Acute sinusitis may last up to 12 weeks  Chronic sinusitis lasts longer than 12 weeks  Recurrent sinusitis means you have 4 or more infections in 1 year  Common signs and symptoms:   · Fever    · Pain, pressure, redness, or swelling around the forehead, cheeks, or eyes    · Thick yellow or green discharge from your nose    · Tenderness when you touch your face over your sinuses    · Dry cough that happens mostly at night or when you lie down    · Headache and face pain that is worse when you lean forward    · Tooth pain, or pain when you chew    Seek care immediately if:   · You have trouble breathing or wheezing that is getting worse  · You have a stiff neck, a fever, or a bad headache  · You cannot open your eye  · Your eyeball bulges out or you cannot move your eye  · You are more sleepy than normal, or you notice changes in your ability to think, move, or talk  · You have swelling of your forehead or scalp      Call your doctor if:   · You have vision changes, such as double vision  · Your eye and eyelid are red, swollen, and painful  · Your symptoms do not improve or go away after 10 days  · You have nausea and are vomiting  · Your nose is bleeding  · You have questions or concerns about your condition or care  Medicines: Your symptoms may go away on their own  Your healthcare provider may recommend watchful waiting for up to 10 days before starting antibiotics  You may need any of the following:  · Acetaminophen  decreases pain and fever  It is available without a doctor's order  Ask how much to take and how often to take it  Follow directions  Read the labels of all other medicines you are using to see if they also contain acetaminophen, or ask your doctor or pharmacist  Acetaminophen can cause liver damage if not taken correctly  Do not use more than 4 grams (4,000 milligrams) total of acetaminophen in one day  · NSAIDs , such as ibuprofen, help decrease swelling, pain, and fever  This medicine is available with or without a doctor's order  NSAIDs can cause stomach bleeding or kidney problems in certain people  If you take blood thinner medicine, always ask your healthcare provider if NSAIDs are safe for you  Always read the medicine label and follow directions  · Nasal steroid sprays  may help decrease inflammation in your nose and sinuses  · Decongestants  help reduce swelling and drain mucus in the nose and sinuses  They may help you breathe easier  · Antihistamines  help dry mucus in the nose and relieve sneezing  · Antibiotics  help treat or prevent a bacterial infection  Self-care:   · Rinse your sinuses as directed  Use a sinus rinse device to rinse your nasal passages with a saline (salt water) solution or distilled water  Do not use tap water  This will help thin the mucus in your nose and rinse away pollen and dirt  It will also help reduce swelling so you can breathe normally      · Use a humidifier  to increase air moisture in your home  This may make it easier for you to breathe and help decrease your cough  · Sleep with your head elevated  Place an extra pillow under your head before you go to sleep to help your sinuses drain  · Drink liquids as directed  Ask your healthcare provider how much liquid to drink each day and which liquids are best for you  Liquids will thin the mucus in your nose and help it drain  Avoid drinks that contain alcohol or caffeine  · Do not smoke, and avoid secondhand smoke  Nicotine and other chemicals in cigarettes and cigars can make your symptoms worse  Ask your healthcare provider for information if you currently smoke and need help to quit  E-cigarettes or smokeless tobacco still contain nicotine  Talk to your healthcare provider before you use these products  Prevent the spread of germs:   · Wash your hands often with soap and water  Wash your hands after you use the bathroom, change a child's diaper, or sneeze  Wash your hands before you prepare or eat food  · Stay away from people who are sick  Some germs spread easily and quickly through contact  Follow up with your doctor as directed: You may be referred to an ear, nose, and throat specialist  Write down your questions so you remember to ask them during your visits  © Copyright Lela 2022 Information is for End User's use only and may not be sold, redistributed or otherwise used for commercial purposes  All illustrations and images included in CareNotes® are the copyrighted property of A D A M , Inc  or Kenny Caicedo  The above information is an  only  It is not intended as medical advice for individual conditions or treatments  Talk to your doctor, nurse or pharmacist before following any medical regimen to see if it is safe and effective for you

## 2022-03-29 NOTE — ASSESSMENT & PLAN NOTE
The patient started with symptoms on Saturday of nasal congestion, bilateral ear pain, sore throat and clear nasal drainage  She denies any fevers  Over-the-counter medications such as Tylenol, Flonase and NyQuil will use by the patient but did not relieve her symptoms  Her granddaughter was also sick last week and tested negative for COVID, flu and RSV  The patient does look ill in the office today  She is also complaining of tenderness in the area of her thyroid and sinus pain and pressure  Upon exam the patient does have injected  Tympanic membranes bilaterally as well as the ear effusions and tenderness  I will treat the patient for an ear infection  Augmentin was sent to the patient's pharmacy  Over-the-counter medications such as Mucinex and Robitussin were recommended for the patient's other symptoms  In addition Tessalon Perles and Phenergan with codeine was sent to the patient's pharmacy  Side effects of the Augmentin were discussed with the patient    She has been recommended to take a probiotic  while on these antibiotics as well as to take them with food

## 2022-03-31 ENCOUNTER — TELEPHONE (OUTPATIENT)
Dept: INTERNAL MEDICINE CLINIC | Facility: CLINIC | Age: 57
End: 2022-03-31

## 2022-03-31 NOTE — TELEPHONE ENCOUNTER
Saw Chucho Sauer 3/29--meds are not working, her ear pain is worse, has a horrible cough, dizzy & weak  No has no taste or smell    What should she do?     # [phone]

## 2022-04-29 DIAGNOSIS — M79.7 FIBROMYALGIA: ICD-10-CM

## 2022-04-29 RX ORDER — DULOXETIN HYDROCHLORIDE 60 MG/1
CAPSULE, DELAYED RELEASE ORAL
Qty: 60 CAPSULE | Refills: 0 | Status: SHIPPED | OUTPATIENT
Start: 2022-04-29 | End: 2022-05-15

## 2022-05-15 DIAGNOSIS — M79.7 FIBROMYALGIA: ICD-10-CM

## 2022-05-15 RX ORDER — DULOXETIN HYDROCHLORIDE 60 MG/1
CAPSULE, DELAYED RELEASE ORAL
Qty: 90 CAPSULE | Refills: 1 | Status: SHIPPED | OUTPATIENT
Start: 2022-05-15 | End: 2022-06-23 | Stop reason: SDUPTHER

## 2022-06-07 ENCOUNTER — CONSULT (OUTPATIENT)
Dept: BARIATRICS | Facility: CLINIC | Age: 57
End: 2022-06-07
Payer: COMMERCIAL

## 2022-06-07 VITALS
BODY MASS INDEX: 32.02 KG/M2 | HEART RATE: 96 BPM | DIASTOLIC BLOOD PRESSURE: 78 MMHG | SYSTOLIC BLOOD PRESSURE: 118 MMHG | WEIGHT: 199.2 LBS | HEIGHT: 66 IN | TEMPERATURE: 97.2 F | RESPIRATION RATE: 14 BRPM

## 2022-06-07 DIAGNOSIS — E78.2 MIXED HYPERLIPIDEMIA: ICD-10-CM

## 2022-06-07 DIAGNOSIS — R63.5 ABNORMAL WEIGHT GAIN: ICD-10-CM

## 2022-06-07 DIAGNOSIS — M79.7 FIBROMYALGIA: ICD-10-CM

## 2022-06-07 DIAGNOSIS — E66.9 OBESITY, CLASS I, BMI 30-34.9: Primary | ICD-10-CM

## 2022-06-07 DIAGNOSIS — Z12.11 ENCOUNTER FOR SCREENING COLONOSCOPY: ICD-10-CM

## 2022-06-07 DIAGNOSIS — F41.9 ANXIETY: ICD-10-CM

## 2022-06-07 PROCEDURE — 99203 OFFICE O/P NEW LOW 30 MIN: CPT | Performed by: INTERNAL MEDICINE

## 2022-06-07 RX ORDER — TOPIRAMATE 50 MG/1
TABLET, FILM COATED ORAL
Qty: 30 TABLET | Refills: 3 | Status: SHIPPED | OUTPATIENT
Start: 2022-06-07 | End: 2022-06-30

## 2022-06-07 NOTE — PROGRESS NOTES
Assessment/Plan:  Consuelo Chairez was seen today for consult  Diagnoses and all orders for this visit:      Mixed hyperlipidemia  Increase activity level to 150 minutes of moderate to intense exercise per week  Weight loss should help improve the lipid levels  Avoid foods with trans fat, limit saturated fats and refined carbohydrates  Increase fish/omega 3 consumption  Pt to see her PCP regarding starting therapy for high TG    Fibromyalgia  Discussed SE of weight gain with cymbalta    Anxiety  Untreated  Topamax may help     Obesity, Class I, BMI 30-34 9  Abnormal weight gain  - Discussed options of HealthyCORE-Intensive Lifestyle Intervention Program, Very Low Calorie Diet-VLCD, and Conservative Program and the role of weight loss medications  - Explained the importance of making lifestyle changes first before starting any anti-obesity medications  Patient should demonstrate lifestyle changes first before anti-obesity medication can be initiated  - Patient is interested in pursuing Conservative Program  - Initial weight loss goal of 5-10% weight loss for improved health  - Weight loss can improve patient's co-morbid conditions and/or prevent weight-related complications  - topiramate (TOPAMAX) 50 MG tablet; Take half tablet at dinner for 1 week, then take 1 tablet  Dispense: 30 tablet; Refill: 3  Potential side effects of topiramate include but are not limited to cognitive dysfunction such as forgetfulness, fatigue, upper respiratory infection symptoms, constipation, abdominal upset, numbness/tingling, dizziness, worsening anxiety or depression, and rarely kidney stones  Pt perimenopausal  She has a remote hx of kidney stone 30 years ago    Goals:  Do not skip any meals! Food log (ie ) www myfitnesspal com,sparkpeople  com,loseit com,calorieking  com,etc  baritastic (use skinnytaste  com, dietdoctor  com or smartphone gloria Online-OR for recipes)  No sugary beverages  At least 64oz of water daily    Increase physical activity by 10 minutes daily  Gradually increase physical activity to a goal of 5 days per week for 30 minutes of MODERATE intensity PLUS 2 days per week of FULL BODY resistance training (use smartphone apps FitON, Home Workout, etc )  Goal protein 90g per day  Goal carbohydrates 90g per day   1500 calories   Needs better sleep  Try pool exercises as it may help with her pain    Total time spent: 30 min, with >50% face-to-face time spent counseling patient on diet behavior and exercise modification for weight loss  Follow up in approximately 3 months with Non-Surgical Physician/Advanced Practitioner  Subjective:   Chief Complaint   Patient presents with    Consult     Initial visit with gifty       Patient ID: David aB  is a 49 y o  female with excess weight/obesity here to pursue weight management  Previous notes and records have been reviewed      Past Medical History:   Diagnosis Date    Degenerative disc disease, lumbar     Diverticulitis of intestine     Fibromyalgia      Past Surgical History:   Procedure Laterality Date    FINGER SURGERY      KNEE SURGERY      SPINAL FUSION         HPI:  Wt Readings from Last 20 Encounters:   06/07/22 90 4 kg (199 lb 3 2 oz)   03/29/22 93 5 kg (206 lb 3 2 oz)   03/03/22 93 4 kg (206 lb)       Severity: Mild  Onset:  Over the years     Modifiers: Diet and Exercise, behavioral changes  Contributing factors: Poor Food Choices and Insufficient Physical Activity  Associated symptoms: comorbid conditions  Goal weight loss: 5-10% STG  States she is a big meat eater  Likes chicken, pork   Would eat a 10-12 oz meat for lunch and dinner   Craves meat, not sweets/carbs   B-bread, cold cuts   Or blueberry muffin   S-  L- salad with meat   S-  D- meat veggies  S-     Hydration: 1 gallon water, 1/2 cup coffee   Alcohol: occasionally  Smoking: vapes socially  Exercise: walking/jogging 10-14k daily, weights 6 days a week for about an hour Occupation:  Sleep: interrupted  Pain from fibromyalgia and also perimenopausal sx's   Difficult to sleep more than 3 hours  STOP bang: 3/8    The following portions of the patient's history were reviewed and updated as appropriate: allergies, current medications, past family history, past medical history, past social history, past surgical history, and problem list     Review of Systems   Constitutional: Negative for appetite change, chills and fever  HENT: Negative for rhinorrhea and sore throat  Respiratory: Negative for cough, chest tightness and shortness of breath  Cardiovascular: Negative for chest pain and leg swelling  Gastrointestinal: Negative for abdominal pain, constipation, diarrhea, nausea and vomiting  Endocrine: Negative for cold intolerance and heat intolerance  Genitourinary: Negative for difficulty urinating  Musculoskeletal: Negative for arthralgias  Skin: Negative for color change  Neurological: Negative for dizziness, numbness and headaches  Psychiatric/Behavioral: Negative for sleep disturbance  The patient is nervous/anxious  All other systems reviewed and are negative  Objective:  /78 (BP Location: Right arm, Patient Position: Sitting, Cuff Size: Standard)   Pulse 96   Temp (!) 97 2 °F (36 2 °C) (Tympanic)   Resp 14   Ht 5' 6" (1 676 m)   Wt 90 4 kg (199 lb 3 2 oz)   BMI 32 15 kg/m²   Constitutional: Well-developed, well-nourished and obese Body mass index is 32 15 kg/m²  [place] HEENT: No conjunctival pallor or jaundice  Pulmonary: No increased work of breathing or signs of respiratory distress  CV: Normal rate, well-perfused  GI: Obese  Non-distended  MSK: No edema   Neuro: Oriented to person, place and time  Normal Speech  Normal gait  Psych: Normal affect and mood  Labs and Imaging  Recent labs and imaging have been personally reviewed    No results found for: WBC, HGB, HCT, MCV, PLT  Lab Results   Component Value Date    SODIUM 137 03/08/2022    K 3 9 03/08/2022     03/08/2022    CO2 27 03/08/2022    AGAP 5 03/08/2022    BUN 12 03/08/2022    CREATININE 0 68 03/08/2022    GLUF 139 (H) 03/08/2022    CALCIUM 9 6 03/08/2022    AST 21 03/08/2022    ALT 33 03/08/2022    ALKPHOS 92 03/08/2022    TP 8 6 (H) 03/08/2022    TBILI 0 44 03/08/2022    EGFR 99 03/08/2022     Lab Results   Component Value Date    HGBA1C 6 1 (H) 03/08/2022     Lab Results   Component Value Date    VNC5GWXORXGW 1 450 03/08/2022     Lab Results   Component Value Date    CHOLESTEROL 277 (H) 03/08/2022     Lab Results   Component Value Date    HDL 48 (L) 03/08/2022     Lab Results   Component Value Date    TRIG 477 (H) 03/08/2022     Lab Results   Component Value Date    [address]  03/08/2022      Comment:      Calculated LDL invalid, triglycerides >400 mg/dl

## 2022-06-23 ENCOUNTER — OFFICE VISIT (OUTPATIENT)
Dept: INTERNAL MEDICINE CLINIC | Facility: CLINIC | Age: 57
End: 2022-06-23
Payer: COMMERCIAL

## 2022-06-23 VITALS
RESPIRATION RATE: 18 BRPM | DIASTOLIC BLOOD PRESSURE: 76 MMHG | OXYGEN SATURATION: 97 % | WEIGHT: 196.8 LBS | BODY MASS INDEX: 31.76 KG/M2 | TEMPERATURE: 96 F | HEART RATE: 80 BPM | SYSTOLIC BLOOD PRESSURE: 112 MMHG

## 2022-06-23 DIAGNOSIS — M79.601 PAIN IN BOTH UPPER EXTREMITIES: Primary | ICD-10-CM

## 2022-06-23 DIAGNOSIS — M79.602 PAIN IN BOTH UPPER EXTREMITIES: Primary | ICD-10-CM

## 2022-06-23 DIAGNOSIS — M79.7 FIBROMYALGIA: ICD-10-CM

## 2022-06-23 PROCEDURE — 99214 OFFICE O/P EST MOD 30 MIN: CPT | Performed by: FAMILY MEDICINE

## 2022-06-23 RX ORDER — DULOXETIN HYDROCHLORIDE 60 MG/1
60 CAPSULE, DELAYED RELEASE ORAL 2 TIMES DAILY
Qty: 60 CAPSULE | Refills: 0 | Status: SHIPPED | OUTPATIENT
Start: 2022-06-23 | End: 2022-07-07

## 2022-06-23 NOTE — PROGRESS NOTES
FOLLOW-UP OFFICE VISIT  [facility]    NAME: Bryan Joyce  AGE: 49 y o  SEX: female  : 1965     DATE: 2022     Assessment and Plan:     Problem List Items Addressed This Visit        Other    Fibromyalgia    Relevant Medications    DULoxetine (CYMBALTA) 60 mg delayed release capsule      Other Visit Diagnoses     Pain in both upper extremities    -  Primary    Relevant Orders    EMG 2 Limb Upper Extremity        What is known to pt as fibro pain has not improved with increase of cymbalta to 60 qd  For now will increase to 60mg bid dosing  Will evaluate for neuropathy via emg since fibro is usually widespread and pt consistently c/o arms and hand  Area of complaint doesn't involve shouler so likley not PM/DM or PMR        No follow-ups on file  Chief Complaint:     Chief Complaint   Patient presents with    Follow-up     Pt states that provider increase meds and now she is feeling pain both arms and hands and need assistant to do things at home and also not sleeping at night        History of Present Illness:     Presents for fibro f/u  Says pain in her upper ext  Has not improved with the increase of Cymbalta from 30 to 60  It is still sever and concentrated in her arms and hands  More recently she has been struggling with her   Dropping objects  Unable to open cans or jars  Denies swelling of the joints in the hand  The pain has become so sever that it keeps her wake at night  Review of Systems:     Review of Systems   Constitutional: Positive for fatigue  Musculoskeletal: Positive for myalgias  Negative for gait problem and joint swelling  Neurological: Positive for weakness and numbness          Problem List:     Patient Active Problem List   Diagnosis    Prediabetes    Hyperlipidemia    Fibromyalgia    BMI 32 0-32 9,adult    Non-recurrent acute serous otitis media of both ears        Objective:     /76 (BP Location: Left arm, Patient Position: Sitting, Cuff Size: Standard)   Pulse 80   Temp (!) 96 °F (35 6 °C) (Temporal)   Resp 18   Wt 89 3 kg (196 lb 12 8 oz)   SpO2 97%   BMI 31 76 kg/m²     Physical Exam  HENT:      Head: Normocephalic and atraumatic  Cardiovascular:      Rate and Rhythm: Normal rate  Pulmonary:      Effort: Pulmonary effort is normal    Musculoskeletal:      Right upper arm: Tenderness (generalized along the arm ) present  No swelling or bony tenderness  Left upper arm: Tenderness present  No swelling or bony tenderness  Neurological:      Mental Status: She is alert  Motor: Weakness present  No atrophy           Pertinent Laboratory/Diagnostic Studies:    Laboratory Results: I have personally reviewed the pertinent laboratory results/reports     Chemistry Profile:   Results from Last 12 Months   Lab Units 03/08/22  1124   POTASSIUM mmol/L 3 9   CHLORIDE mmol/L 105   CO2 mmol/L 27   BUN mg/dL 12   CREATININE mg/dL 0 68   GLUCOSE FASTING mg/dL 139*   CALCIUM mg/dL 9 6   AST U/L 21   ALT U/L 33   ALK PHOS U/L 92   EGFR ml/min/1 73sq m 99 [facility] O   [place] [facility]  6/23/2022 4:23 PM

## 2022-06-30 DIAGNOSIS — E66.9 OBESITY, CLASS I, BMI 30-34.9: ICD-10-CM

## 2022-06-30 DIAGNOSIS — R63.5 ABNORMAL WEIGHT GAIN: ICD-10-CM

## 2022-06-30 DIAGNOSIS — F41.9 ANXIETY: ICD-10-CM

## 2022-06-30 DIAGNOSIS — M79.7 FIBROMYALGIA: ICD-10-CM

## 2022-06-30 RX ORDER — TOPIRAMATE 50 MG/1
TABLET, FILM COATED ORAL
Qty: 90 TABLET | Refills: 2 | Status: SHIPPED | OUTPATIENT
Start: 2022-06-30

## 2022-07-01 ENCOUNTER — TELEPHONE (OUTPATIENT)
Dept: BARIATRICS | Facility: CLINIC | Age: 57
End: 2022-07-01

## 2022-07-01 NOTE — TELEPHONE ENCOUNTER
Spoke with patient  The medication that was prescribed is making her ill  She is wanting to switch to something else    Please call patient to discuss

## 2022-07-01 NOTE — TELEPHONE ENCOUNTER
Spoke with patient and aware her Dr Anna Nunez will be here next Tuesday  Patient states is feeling anxious since she is taking Topamax  Patient instructed to stop taking Topamax and if she experience any nausea, vomiting, diarrhea to go to the ER  In addition was encouraged to give our office a call next Tuesday with an Updated of her anxiety or feeling anxious  Patient agreed and verbally understood

## 2022-07-05 ENCOUNTER — TELEPHONE (OUTPATIENT)
Dept: BARIATRICS | Facility: CLINIC | Age: 57
End: 2022-07-05

## 2022-07-05 NOTE — TELEPHONE ENCOUNTER
Spoke with patient and she states is feeling better  Patient doesn't want take Topamax  She is looking for other medicine that can replace Topamax  Patient was told she will need an appointment  Patient agreed with plan and would like to be scheduled with the new doctor  Pls schedule her with Catherine Butler

## 2022-07-07 DIAGNOSIS — M79.7 FIBROMYALGIA: ICD-10-CM

## 2022-07-07 RX ORDER — DULOXETIN HYDROCHLORIDE 60 MG/1
CAPSULE, DELAYED RELEASE ORAL
Qty: 180 CAPSULE | Refills: 1 | Status: SHIPPED | OUTPATIENT
Start: 2022-07-07

## 2022-07-28 ENCOUNTER — TELEPHONE (OUTPATIENT)
Dept: BARIATRICS | Facility: CLINIC | Age: 57
End: 2022-07-28

## 2022-09-08 ENCOUNTER — OFFICE VISIT (OUTPATIENT)
Dept: BARIATRICS | Facility: CLINIC | Age: 57
End: 2022-09-08
Payer: COMMERCIAL

## 2022-09-08 VITALS
BODY MASS INDEX: 32.72 KG/M2 | SYSTOLIC BLOOD PRESSURE: 118 MMHG | RESPIRATION RATE: 16 BRPM | HEIGHT: 66 IN | TEMPERATURE: 96.5 F | HEART RATE: 91 BPM | WEIGHT: 203.6 LBS | DIASTOLIC BLOOD PRESSURE: 80 MMHG

## 2022-09-08 DIAGNOSIS — F41.9 ANXIETY: ICD-10-CM

## 2022-09-08 DIAGNOSIS — R73.03 PREDIABETES: Primary | ICD-10-CM

## 2022-09-08 DIAGNOSIS — E78.2 MIXED HYPERLIPIDEMIA: ICD-10-CM

## 2022-09-08 PROCEDURE — 99214 OFFICE O/P EST MOD 30 MIN: CPT | Performed by: FAMILY MEDICINE

## 2022-09-08 RX ORDER — METFORMIN HYDROCHLORIDE 500 MG/1
500 TABLET, EXTENDED RELEASE ORAL
Qty: 30 TABLET | Refills: 1 | Status: SHIPPED | OUTPATIENT
Start: 2022-09-08 | End: 2022-10-03

## 2022-09-08 NOTE — PROGRESS NOTES
Assessment/Plan:  Brody Knox was seen today for follow-up  Diagnoses and all orders for this visit:    Prediabetes  -     metFORMIN (GLUCOPHAGE-XR) 500 mg 24 hr tablet; Take 1 tablet (500 mg total) by mouth daily with dinner  Diet plan given   Low carb snack list given  Anxiety  counselling provided  Stress relief advised  May consider Wellbutrin in the future, will ask her psychiatrist about it  Topamax made her more anxious  Mixed hyperlipidemia  Goal is to control Glucose, use Metformin and diet plan  Plan to recheck  Weight loss will improve HDL and TG panel  Advised decrease saturated fats and increase omega3 fat content of meals    BMI 32 0-32 9,adult  Start Metfomrin   Stopped Topamax due to mood side effects   -Patient following conservative program  Calorie goal: 1200-1500kcal  -Labs reviewed and ordered:   -Encourage mindful eating, portion control, motivational interview performed to help patient reach goals   -patient noted changes to work on until next visit  Brother had thyroid cc so not candidate for GLP-1 agonists      Total time spent: 30 minutes with >50% face-to-face time with the patient  Follow up in approximately 2 months with Non-Surgical Physician/Advanced Practitioner  Subjective:   Chief Complaint   Patient presents with    Follow-up       Patient ID: Rosalind Mata  is a 49 y o  female with excess weight/obesity here to pursue weight management  Patient is pursuing Conservative Program    Most recent notes and records were reviewed      HPI  -Initial weight loss goal of 5-10% weight loss for improved health  Wt Readings from Last 10 Encounters:   09/08/22 92 4 kg (203 lb 9 6 oz)   06/23/22 89 3 kg (196 lb 12 8 oz)   06/07/22 90 4 kg (199 lb 3 2 oz)   03/29/22 93 5 kg (206 lb 3 2 oz)   03/03/22 93 4 kg (206 lb)     Initial weight:6/7/22 199lbs  Current weight:203lbs  Change in weight:+4lbs    Feeling very stressed due to family situation  Always thinking about food, always feeling hungry never full enough   Food logging: no  Increased appetite/cravings:yes  Exercise: cardio 6 days a week 1 hour aerobic  Hydration: water 1 gallon  Alcohol: once or twice a week   Sleep: 4 hrs only mind racing cannot shut the brain down         The following portions of the patient's history were reviewed and updated as appropriate: allergies, current medications, past family history, past medical history, past social history, past surgical history, and problem list       Review of Systems   Constitutional: Negative for activity change  Fatigue  HENT: Negative for trouble swallowing  Respiratory: Negative for shortness of breath  Cardiovascular: Negative for chest pain, edema  Gastrointestinal: Negative for abdominal pain, nausea and vomiting, acid reflux, constipation/diarrhea  Endocrine: negative for heat /cold intolerance  Genitourinary: Negative for difficulty urinating  Musculoskeletal: Negative for gait problem and myalgias  Psychiatric/Behavioral: + for behavioral problems , feeling a bit down anxiety or depression    Objective:  /80 (Cuff Size: Large)   Pulse 91   Temp (!) 96 5 °F (35 8 °C)   Resp 16   Ht 5' 6" (1 676 m)   Wt 92 4 kg (203 lb 9 6 oz)   BMI 32 86 kg/m²   Constitutional: Well-developed, well-nourished and Obese Body mass index is 32 86 kg/m²  Trace Arora HEENT: No conjunctival injection  No thyroid masses  Pulmonary: No increased work of breathing or signs of respiratory distress  Clear respiratory sounds  CV: Well-perfused, Regular rate and rhythm, no murmurs, no edema  GI: increased abdominal girth  Non-distended  Not tender   Endo: no ophthalmopathy, no dermopathy, truncal obesity  MSK: no sarcopenia  Neuro: Oriented to person, place and time  Normal Speech  Normal gait  Psych: anxiety affect and mood  No delusion or hallucinations, normal thought process    Labs and Imaging  Recent labs and imaging have been personally reviewed    No results found for: WBC, HGB, HCT, MCV, PLT  Lab Results   Component Value Date    SODIUM 137 03/08/2022    K 3 9 03/08/2022     03/08/2022    CO2 27 03/08/2022    AGAP 5 03/08/2022    BUN 12 03/08/2022    CREATININE 0 68 03/08/2022    GLUF 139 (H) 03/08/2022    CALCIUM 9 6 03/08/2022    AST 21 03/08/2022    ALT 33 03/08/2022    ALKPHOS 92 03/08/2022    TP 8 6 (H) 03/08/2022    TBILI 0 44 03/08/2022    EGFR 99 03/08/2022     Lab Results   Component Value Date    HGBA1C 6 1 (H) 03/08/2022     Lab Results   Component Value Date    [id number] 1 450 03/08/2022     Lab Results   Component Value Date    CHOLESTEROL 277 (H) 03/08/2022     Lab Results   Component Value Date    HDL 48 (L) 03/08/2022     Lab Results   Component Value Date    TRIG 477 (H) 03/08/2022     Lab Results   Component Value Date    LDLCALC  03/08/2022      Comment:      Calculated LDL invalid, triglycerides >400 mg/dl

## 2022-09-30 DIAGNOSIS — R73.03 PREDIABETES: ICD-10-CM

## 2022-10-03 RX ORDER — METFORMIN HYDROCHLORIDE 500 MG/1
TABLET, EXTENDED RELEASE ORAL
Qty: 90 TABLET | Refills: 1 | Status: SHIPPED | OUTPATIENT
Start: 2022-10-03

## 2022-10-11 PROBLEM — H65.03 NON-RECURRENT ACUTE SEROUS OTITIS MEDIA OF BOTH EARS: Status: RESOLVED | Noted: 2022-03-29 | Resolved: 2022-10-11

## 2022-10-26 ENCOUNTER — PROCEDURE VISIT (OUTPATIENT)
Dept: NEUROLOGY | Facility: CLINIC | Age: 57
End: 2022-10-26
Payer: COMMERCIAL

## 2022-10-26 DIAGNOSIS — M79.601 PAIN IN BOTH UPPER EXTREMITIES: ICD-10-CM

## 2022-10-26 DIAGNOSIS — M79.602 PAIN IN BOTH UPPER EXTREMITIES: ICD-10-CM

## 2022-10-26 PROCEDURE — 95886 MUSC TEST DONE W/N TEST COMP: CPT | Performed by: PHYSICAL MEDICINE & REHABILITATION

## 2022-10-26 PROCEDURE — 95911 NRV CNDJ TEST 9-10 STUDIES: CPT | Performed by: PHYSICAL MEDICINE & REHABILITATION

## 2022-10-26 NOTE — PROGRESS NOTES
EMG 2 Limb Upper Extremity     Date/Time 10/26/2022 12:41 PM     Performed by  Aislinn Ascencio MD     Authorized by Ricardo Feliciano DO                [facility] [address]  [phone]    Electromyography & Nerve Conduction Studies Report          Full Name: Christine Joy Gender: Female  MRN: [id number] YOB: 1965      Visit Date: 10/26/2022 12:07 PM  Age: 56 Years  Examining Physician: Aislinn Ascencio MD   Referring Physician: DR EARLY   Medical history:  79-year-old left-handed female with prior history of fibromyalgia presents with complaints of bilateral arm pain that keeps her awake at night  She denies any neck pain radiating down the arms  Patient is being evaluated for a focal neuropathy   Sensory Nerve Conduction Study       Nerve / Sites Rec  Site Onset Lat Peak Lat  Amp Segments Distance Velocity     ms ms µV  cm m/s   R Median - Dig II (Antidromic)      Wrist Index 2 4 3 4 29 6 Wrist - Index 13 53      Ref  ?3 5 ? 20 0 Ref  ?50   L Median - Dig II (Antidromic)      Wrist Index 2 5 3 6 29 3 Wrist - Index 13 52      Ref  ?3 5 ? 20 0 Ref  ?50   R Ulnar - Dig V (Antidromic)      Wrist Dig V 1 9 2 6 26 6 Wrist - Dig V 11 59      Ref  ?3 1 ? 17 0 Ref  ?50   L Ulnar - Dig V (Antidromic)      Wrist Dig V 2 0 2 9 21 5 Wrist - Dig V 11 54      Ref  ?3 1 ? 17 0 Ref  ?50   R Radial - Superficial (Antidromic)      Forearm Wrist 1 5 2 2 23 1 Forearm - Wrist 10 66      Ref  ?2 9 ? 15 0 Ref  ?50   L Radial - Superficial (Antidromic)      Forearm Wrist 1 6 2 1 23 8 Forearm - Wrist 10 62      Ref  ?2 9 ? 15 0 Ref  ?50       Motor Nerve Conduction Study       Nerve / Sites Muscle Latency Ref  Amplitude Ref  Segments Distance Lat Diff Velocity Ref       ms ms mV mV  cm ms m/s m/s   R Median - APB      Wrist APB 3 3 ?4 4 8 9 ?4 0 Wrist - APB 7         Elbow APB 7 2  7 6  Elbow - Wrist 22 3 88 57 ?49   L Median - APB Wrist APB 3 9 ?4 4 8 3 ?4 0 Wrist - APB 7         Elbow APB 8 0  7 8  Elbow - Wrist 22 4 15 53 ?49   R Ulnar - ADM      Wrist ADM 2 0 ?3 3 8 3 ?6 0 Wrist - ADM 7         B  Elbow ADM 5 8  8 2  B  Elbow - Wrist 21 3 81 55 ?49      A  Elbow ADM 7 6  8 0  A  Elbow - B  Elbow 10 1 79 56 ?49   L Ulnar - ADM      Wrist ADM 2 3 ?3 3 7 5 ?6 0 Wrist - ADM 7         B  Elbow ADM 6 3  7 0  B  Elbow - Wrist 21 4 00 53 ?49      A  Elbow ADM 7 8  6 9  A  Elbow - B  Elbow 10 1 56 64 ?49       F Waves       Nerve F Latency Ref  ms ms   R Median - APB 28 5 ?31 0   R Ulnar - ADM 27 7 ?32 0   L Median - APB 27 1 ? 31 0   L Ulnar - ADM 25 1 ? 32 0       EMG Summary Table     Spontaneous MUAP Recruitment   Muscle Nerve Roots IA Fib PSW Fasc H F  Dur  Amp PPP Config Pattern   L  First dorsal interosseous Ulnar C8-T1 NL None None None None NL NL None NL NL   L  Deltoid Axillary C5-C6 NL None None None None NL NL None NL NL   R  Deltoid Axillary C5-C6 NL None None None None NL NL None NL NL   R  First dorsal interosseous Ulnar C8-T1 NL None None None None NL NL None NL NL   L  Biceps brachii Musculocut  C5-C6 NL None None None None NL NL None NL NL   R  Biceps brachii Musculocut  C5-C6 NL None None None None NL NL None NL NL   L  Triceps brachii Radial C6-C8 NL None None None None NL NL None NL NL   R  Triceps brachii Radial C6-C8 NL None None None None NL NL None NL NL   L  Pronator teres Median C6-C7 NL None None None None NL NL None NL NL   R  Pronator teres Median C6-C7 NL None None None None NL NL None NL NL   L  Abductor pollicis brevis Median K1-L5 NL None None None None NL NL None NL NL   R  Abductor pollicis brevis Median S9-P6 NL None None None None NL NL None NL NL   L  Cervical paraspinals (low)  - NL None None None None NL NL None NL NL   R   Cervical paraspinals (low)  - NL None None None None NL NL None NL NL                                   Summary        The left and right median and ulnar motor conduction velocities and compound muscle action potentials were normal with normal distal latencies across the wrists  The left median, bilateral superficial radial and ulnar sensory conduction velocity and sensory action potentials were also normal with normal distal latencies across the wrists  The right median sensory peak latency was prolonged with normal sensory action potential amplitude and normal conduction velocity across the wrist     The left and right median and ulnar F wave latencies were within normal limits  Concentric needle EMG was performed on selected muscles of the bilateral upper extremities  There was no evidence of active denervation in any of the muscles tested   Early recruited motor units appear normal   Recruitment patterns were full or full for effort  Impression:      Abnormal study  There is electrophysiologic evidence of a:    1  Mild median nerve compression neuropathy at the wrist on the right with demyelinative changes, consistent with the diagnosis of carpal tunnel syndrome  2   There is no evidence of a cervical radiculopathy or neuropathy bilaterally

## 2022-11-02 ENCOUNTER — TELEPHONE (OUTPATIENT)
Dept: INTERNAL MEDICINE CLINIC | Facility: CLINIC | Age: 57
End: 2022-11-02

## 2022-11-02 DIAGNOSIS — G56.01 CARPAL TUNNEL SYNDROME OF RIGHT WRIST: Primary | ICD-10-CM

## 2022-11-02 NOTE — TELEPHONE ENCOUNTER
----- Message from Patricia Jimenes DO sent at 11/2/2022  1:13 PM EDT -----  Nerve testing shows significant carpel tunnel on the right  I have placed a referral with hand PT/OT for further management

## 2022-12-19 ENCOUNTER — TELEPHONE (OUTPATIENT)
Dept: BARIATRICS | Facility: CLINIC | Age: 57
End: 2022-12-19

## 2022-12-19 NOTE — TELEPHONE ENCOUNTER
Patient called in stating that she is not able to come for appointment tomorrow and would like to know if she can come in on Wednesday or keep her original appointment for Thursday  Please call patient to discuss

## 2022-12-21 ENCOUNTER — APPOINTMENT (OUTPATIENT)
Dept: LAB | Facility: HOSPITAL | Age: 57
End: 2022-12-21

## 2022-12-21 ENCOUNTER — OFFICE VISIT (OUTPATIENT)
Dept: BARIATRICS | Facility: CLINIC | Age: 57
End: 2022-12-21

## 2022-12-21 VITALS
TEMPERATURE: 98.5 F | SYSTOLIC BLOOD PRESSURE: 130 MMHG | BODY MASS INDEX: 33.75 KG/M2 | RESPIRATION RATE: 16 BRPM | HEIGHT: 66 IN | HEART RATE: 98 BPM | WEIGHT: 210 LBS | DIASTOLIC BLOOD PRESSURE: 80 MMHG

## 2022-12-21 DIAGNOSIS — E78.2 MIXED HYPERLIPIDEMIA: ICD-10-CM

## 2022-12-21 DIAGNOSIS — E55.9 VITAMIN D DEFICIENCY: ICD-10-CM

## 2022-12-21 DIAGNOSIS — R73.03 DIABETES MELLITUS, LATENT: ICD-10-CM

## 2022-12-21 DIAGNOSIS — Z00.00 ROUTINE GENERAL MEDICAL EXAMINATION AT A HEALTH CARE FACILITY: ICD-10-CM

## 2022-12-21 DIAGNOSIS — R73.03 PREDIABETES: Primary | ICD-10-CM

## 2022-12-21 DIAGNOSIS — F31.12 BIPOLAR AFFECTIVE DISORDER, CURRENTLY MANIC, MODERATE (HCC): ICD-10-CM

## 2022-12-21 DIAGNOSIS — E66.09 CLASS 1 OBESITY DUE TO EXCESS CALORIES IN ADULT: ICD-10-CM

## 2022-12-21 DIAGNOSIS — E53.8 VITAMIN B12 DEFICIENCY (NON ANEMIC): ICD-10-CM

## 2022-12-21 DIAGNOSIS — Z87.442 HISTORY OF KIDNEY STONES: ICD-10-CM

## 2022-12-21 DIAGNOSIS — E78.1 PURE HYPERGLYCERIDEMIA: ICD-10-CM

## 2022-12-21 DIAGNOSIS — Z11.4 SCREENING FOR HUMAN IMMUNODEFICIENCY VIRUS: ICD-10-CM

## 2022-12-21 PROBLEM — E66.01 MORBID OBESITY (HCC): Status: RESOLVED | Noted: 2022-12-21 | Resolved: 2022-12-21

## 2022-12-21 PROBLEM — E66.01 MORBID OBESITY (HCC): Status: ACTIVE | Noted: 2022-12-21

## 2022-12-21 LAB
25(OH)D3 SERPL-MCNC: 26.6 NG/ML (ref 30–100)
ALBUMIN SERPL BCP-MCNC: 3.8 G/DL (ref 3.5–5)
ALP SERPL-CCNC: 91 U/L (ref 46–116)
ALT SERPL W P-5'-P-CCNC: 34 U/L (ref 12–78)
ANION GAP SERPL CALCULATED.3IONS-SCNC: 8 MMOL/L (ref 4–13)
AST SERPL W P-5'-P-CCNC: 23 U/L (ref 5–45)
BASOPHILS # BLD AUTO: 0.02 THOUSANDS/ÂΜL (ref 0–0.1)
BASOPHILS NFR BLD AUTO: 0 % (ref 0–1)
BILIRUB SERPL-MCNC: 0.39 MG/DL (ref 0.2–1)
BUN SERPL-MCNC: 20 MG/DL (ref 5–25)
CALCIUM SERPL-MCNC: 9.2 MG/DL (ref 8.3–10.1)
CHLORIDE SERPL-SCNC: 106 MMOL/L (ref 96–108)
CHOLEST SERPL-MCNC: 242 MG/DL
CO2 SERPL-SCNC: 24 MMOL/L (ref 21–32)
CREAT SERPL-MCNC: 0.73 MG/DL (ref 0.6–1.3)
EOSINOPHIL # BLD AUTO: 0.13 THOUSAND/ÂΜL (ref 0–0.61)
EOSINOPHIL NFR BLD AUTO: 3 % (ref 0–6)
ERYTHROCYTE [DISTWIDTH] IN BLOOD BY AUTOMATED COUNT: 12.8 % (ref 11.6–15.1)
EST. AVERAGE GLUCOSE BLD GHB EST-MCNC: 126 MG/DL
GFR SERPL CREATININE-BSD FRML MDRD: 93 ML/MIN/1.73SQ M
GLUCOSE P FAST SERPL-MCNC: 126 MG/DL (ref 65–99)
HBA1C MFR BLD: 6 %
HCT VFR BLD AUTO: 41.1 % (ref 34.8–46.1)
HDLC SERPL-MCNC: 49 MG/DL
HGB BLD-MCNC: 13.3 G/DL (ref 11.5–15.4)
IMM GRANULOCYTES # BLD AUTO: 0.01 THOUSAND/UL (ref 0–0.2)
IMM GRANULOCYTES NFR BLD AUTO: 0 % (ref 0–2)
LYMPHOCYTES # BLD AUTO: 1.93 THOUSANDS/ÂΜL (ref 0.6–4.47)
LYMPHOCYTES NFR BLD AUTO: 38 % (ref 14–44)
MCH RBC QN AUTO: 30 PG (ref 26.8–34.3)
MCHC RBC AUTO-ENTMCNC: 32.4 G/DL (ref 31.4–37.4)
MCV RBC AUTO: 93 FL (ref 82–98)
MONOCYTES # BLD AUTO: 0.41 THOUSAND/ÂΜL (ref 0.17–1.22)
MONOCYTES NFR BLD AUTO: 8 % (ref 4–12)
NEUTROPHILS # BLD AUTO: 2.64 THOUSANDS/ÂΜL (ref 1.85–7.62)
NEUTS SEG NFR BLD AUTO: 51 % (ref 43–75)
NONHDLC SERPL-MCNC: 193 MG/DL
NRBC BLD AUTO-RTO: 0 /100 WBCS
PLATELET # BLD AUTO: 285 THOUSANDS/UL (ref 149–390)
PMV BLD AUTO: 9.3 FL (ref 8.9–12.7)
POTASSIUM SERPL-SCNC: 3.7 MMOL/L (ref 3.5–5.3)
PROT SERPL-MCNC: 7.7 G/DL (ref 6.4–8.4)
RBC # BLD AUTO: 4.44 MILLION/UL (ref 3.81–5.12)
SODIUM SERPL-SCNC: 138 MMOL/L (ref 135–147)
TRIGL SERPL-MCNC: 427 MG/DL
TSH SERPL DL<=0.05 MIU/L-ACNC: 3.17 UIU/ML (ref 0.45–4.5)
VIT B12 SERPL-MCNC: 590 PG/ML (ref 100–900)
WBC # BLD AUTO: 5.14 THOUSAND/UL (ref 4.31–10.16)

## 2022-12-21 RX ORDER — TRAZODONE HYDROCHLORIDE 150 MG/1
150 TABLET ORAL
COMMUNITY

## 2022-12-21 RX ORDER — LAMOTRIGINE 25 MG/1
25 TABLET ORAL DAILY
COMMUNITY

## 2022-12-21 NOTE — PROGRESS NOTES
Assessment/Plan:  Edna Shipman was seen today for follow-up  Diagnoses and all orders for this visit:  1  Prediabetes    - Semaglutide,0 25 or 0 5MG/DOS, 2 MG/1 5ML SOPN; INJECT Ozempic Weekly SUBCUTANEOUSLY  -WEEK 1: 0 25 mg weekly for 4 weeks than increase to 0 5 mg once a week -IF NAUSEA/VOMITING DEVELOP STOP MEDICATION FOR A FEW DAYS AND DECREASE TO PREVIOUSLY TOLERATED DOSE  STAY HYDRATED  -IF YOU DEVELOP SEVERE ABDOMINAL PAIN WHICH MAY RADIATE TO THE BACK, SOMETIMES ASSOCIATED WITH FEVER, AND VOMITING, STOP MEDICATION AND SEEK URGENT CARE AS THIS MAY BE A SIGN OF PANCREATITIS  Patient denies personal or family Hx of medullary thyroid cancer, MEN-2 and pancreatitis  Patient understands these major side effects and agrees to start the medication  Dispense: 3 mL; Refill: 1  Cont Metformin  2  Class 1 obesity due to excess calories in adult   worsening try GLP1 brother does not have thyroid cc  Topamax made her more anxious   Stopped Topamax due to mood side effects had also kidney stones   3  Bipolar affective disorder, currently manic, moderate ([address] )   Lamictal started Oct 2022 and weight gain increased afterwards cannot control her hunger  Will discuss Wellbutrin with her psychiatrist     -Patient following conservative program  Calorie goal: 1200-1500kcalt  Brother did not have thyroid cc , she confirmed with him, so would try GLP-1 agonists    Follow up in approximately 2 months with Non-Surgical Physician/Advanced Practitioner  Subjective:   Chief Complaint   Patient presents with   • Follow-up       Patient ID: Trudy Valles  is a 56 y o  female with excess weight/obesity here to pursue weight management  Patient is pursuing Conservative Program    Most recent notes and records were reviewed      HPI  -Initial weight loss goal of 5-10% weight loss for improved health  Wt Readings from Last 10 Encounters:   12/21/22 95 3 kg (210 lb)   09/08/22 92 4 kg (203 lb 9 6 oz)   06/23/22 89 3 kg (196 lb 12 8 oz) 06/07/22 90 4 kg (199 lb 3 2 oz)   03/29/22 93 5 kg (206 lb 3 2 oz)   03/03/22 93 4 kg (206 lb)     Initial weight:6/7/22 199lbs  Current weight:210lbs  Change in weight:+7lbs    Feeling very stressed due to family situation  Always thinking about food, always feeling hungry never full enough   Food logging: no  Increased appetite/cravings:yes  Exercise: cardio 6 days a week 1 hour aerobic  Hydration: water 1 gallon  Alcohol: once or twice a week   Sleep: 4 hrs only mind racing cannot shut the brain down         The following portions of the patient's history were reviewed and updated as appropriate: allergies, current medications, past family history, past medical history, past social history, past surgical history, and problem list       Review of Systems   Constitutional: Negative for activity change  Fatigue  HENT: Negative for trouble swallowing  Respiratory: Negative for shortness of breath  Cardiovascular: Negative for chest pain, edema  Gastrointestinal: Negative for abdominal pain, nausea and vomiting, acid reflux, constipation/diarrhea  Endocrine: negative for heat /cold intolerance  Genitourinary: Negative for difficulty urinating  Musculoskeletal: Negative for gait problem and myalgias  Psychiatric/Behavioral: + for behavioral problems , feeling a bit down anxiety or depression    Objective:  /80 (BP Location: Left arm, Patient Position: Sitting, Cuff Size: Large)   Pulse 98   Temp 98 5 °F (36 9 °C)   Resp 16   Ht 5' 6" (1 676 m)   Wt 95 3 kg (210 lb)   BMI 33 89 kg/m²   Constitutional: Well-developed, well-nourished and Obese Body mass index is 33 89 kg/m²  Tamika Nice HEENT: No conjunctival injection  No thyroid masses  Pulmonary: No increased work of breathing or signs of respiratory distress  Clear respiratory sounds  CV: Well-perfused, Regular rate and rhythm, no murmurs, no edema  GI: increased abdominal girth  Non-distended   Not tender   Endo: no ophthalmopathy, no dermopathy, truncal obesity  MSK: no sarcopenia  Neuro: Oriented to person, place and time  Normal Speech  Normal gait  Psych: + anxiety affect and mood  No delusion or hallucinations, normal thought process    Labs and Imaging  Recent labs and imaging have been personally reviewed    Lab Results   Component Value Date    WBC 5 14 12/21/2022    HGB 13 3 12/21/2022    HCT 41 1 12/21/2022    MCV 93 12/21/2022     12/21/2022     Lab Results   Component Value Date    SODIUM 137 03/08/2022    K 3 9 03/08/2022     03/08/2022    CO2 27 03/08/2022    AGAP 5 03/08/2022    BUN 12 03/08/2022    CREATININE 0 68 03/08/2022    GLUF 139 (H) 03/08/2022    CALCIUM 9 6 03/08/2022    AST 21 03/08/2022    ALT 33 03/08/2022    ALKPHOS 92 03/08/2022    TP 8 6 (H) 03/08/2022    TBILI 0 44 03/08/2022    EGFR 99 03/08/2022     Lab Results   Component Value Date    HGBA1C 6 1 (H) 03/08/2022     Lab Results   Component Value Date    [id number] 1 450 03/08/2022     Lab Results   Component Value Date    CHOLESTEROL 277 (H) 03/08/2022     Lab Results   Component Value Date    HDL 48 (L) 03/08/2022     Lab Results   Component Value Date    TRIG 477 (H) 03/08/2022     Lab Results   Component Value Date    LDLCALC  03/08/2022      Comment:      Calculated LDL invalid, triglycerides >400 mg/dl

## 2022-12-22 LAB — HIV 1+2 AB+HIV1 P24 AG SERPL QL IA: NON REACTIVE

## 2023-01-06 ENCOUNTER — TELEPHONE (OUTPATIENT)
Dept: BARIATRICS | Facility: CLINIC | Age: 58
End: 2023-01-06

## 2023-01-06 NOTE — TELEPHONE ENCOUNTER
Received message from patient asking for a change of medication  She didn't specify which medication she was on or what medication she wanted changed to  Tried to call patient back but voicemail was full and I was unable to leave message

## 2023-02-24 ENCOUNTER — OFFICE VISIT (OUTPATIENT)
Dept: BARIATRICS | Facility: CLINIC | Age: 58
End: 2023-02-24

## 2023-02-24 VITALS
SYSTOLIC BLOOD PRESSURE: 130 MMHG | WEIGHT: 207 LBS | RESPIRATION RATE: 16 BRPM | BODY MASS INDEX: 33.27 KG/M2 | HEART RATE: 76 BPM | HEIGHT: 66 IN | TEMPERATURE: 95.6 F | DIASTOLIC BLOOD PRESSURE: 80 MMHG

## 2023-02-24 DIAGNOSIS — E78.2 ELEVATED TRIGLYCERIDES WITH HIGH CHOLESTEROL: ICD-10-CM

## 2023-02-24 DIAGNOSIS — F31.12 BIPOLAR AFFECTIVE DISORDER, CURRENTLY MANIC, MODERATE (HCC): ICD-10-CM

## 2023-02-24 DIAGNOSIS — E66.09 CLASS 1 OBESITY DUE TO EXCESS CALORIES WITH SERIOUS COMORBIDITY AND BODY MASS INDEX (BMI) OF 33.0 TO 33.9 IN ADULT: Primary | ICD-10-CM

## 2023-02-24 DIAGNOSIS — R73.03 PREDIABETES: ICD-10-CM

## 2023-02-24 NOTE — PROGRESS NOTES
Assessment/Plan:  Javed Vaughan was seen today for follow-up  Diagnoses and all orders for this visit:  1  Class 1 obesity due to excess calories with serious comorbidity and body mass index (BMI) of 33 0 to 33 9 in adult  Cont Metfomrin to help with prediabetes and weight  Advised take same dose if GLP-1 will be continued otherwise will increase dose of Metformin since it is not enough to control HbA1c of 6  Cont low fermin diet plan  3 meals per day and hydration  Going to the gym now and feels much better  Topamax made her more anxious   Stopped Topamax due to mood side effects had also kidney stones   Wellbutrin might exacerbate her mary - will not use now  2  Bipolar affective disorder, currently manic, moderate ([address] )  She stopped all of her medications - advised she reaches out to psychiatry to monitor her mood and andcont counseling     3  Prediabetes   would prefer to cont GLP-1 if her sister did not have C cell type of thyroid cc- patient will confirm regarding pathology report  If negative then can proceed with Ozempic since she tolerated well and had good response to it  4  Elevated triglycerides with high cholesterol  Anything that will control her Glucose GLP-1 or Metformin will help  Cont low carb diet plan    Follow up in approximately 2 months with Non-Surgical Physician/Advanced Practitioner  Subjective:   Chief Complaint   Patient presents with   • Follow-up     Patient presents concerned Ozempic side effects and she would like to be switch to [facility]  Patient ID: Jose Beverly  is a 56 y o  female with excess weight/obesity here to pursue weight management  Patient is pursuing Conservative Program    Most recent notes and records were reviewed      HPI  -Initial weight loss goal of 5-10% weight loss for improved health  Wt Readings from Last 10 Encounters:   02/24/23 93 9 kg (207 lb)   12/21/22 95 3 kg (210 lb)   09/08/22 92 4 kg (203 lb 9 6 oz)   06/23/22 89 3 kg (196 lb 12 8 oz)   06/07/22 90 4 kg (199 lb 3 2 oz)   03/29/22 93 5 kg (206 lb 3 2 oz)   03/03/22 93 4 kg (206 lb)     Initial weight:6/7/22 199lbs  Last OV weight:210lbs started Ozempic few weeks after she is on the third week 0 25mg dose tolerates well   Current weight : 207lbs  Change in weight:-3lbs    Feeling very stressed due to family situation but feels more relaxed after stopping akll of her pills for bipolar ds  Not as much hunger  Food logging: no  Increased appetite/cravings:yes  Exercise: cardio 6 days a week 1 hour aerobic  Hydration: water 1 gallon  Alcohol: once or twice a week   Sleep: 4 hrs only mind racing cannot shut the brain down         The following portions of the patient's history were reviewed and updated as appropriate: allergies, current medications, past family history, past medical history, past social history, past surgical history, and problem list       Review of Systems   Constitutional: Negative for activity change  Fatigue  HENT: Negative for trouble swallowing  Respiratory: Negative for shortness of breath  Cardiovascular: Negative for chest pain, edema  Gastrointestinal: Negative for abdominal pain, nausea and vomiting, acid reflux, constipation/diarrhea   Psychiatric/Behavioral: + for behavioral problems , feeling a bit down anxiety or depression    Objective:  /80 (BP Location: Left arm, Patient Position: Sitting, Cuff Size: Large)   Pulse 76   Temp (!) 95 6 °F (35 3 °C)   Resp 16   Ht 5' 6" (1 676 m)   Wt 93 9 kg (207 lb)   BMI 33 41 kg/m²   Constitutional: Well-developed, well-nourished and Obese Body mass index is 33 41 kg/m²  Katarina Prows HEENT: No conjunctival injection  No thyroid masses  Pulmonary: No increased work of breathing or signs of respiratory distress  CV: Well-perfused, Regular rate and rhythm  GI: increased abdominal girth  Non-distended  Not tender   Endo: no ophthalmopathy, no dermopathy, truncal obesity  MSK: no sarcopenia  Psych: + anxiety affect and mood   No delusion or hallucinations, normal thought process    Labs and Imaging  Recent labs and imaging have been personally reviewed  Lab Results   Component Value Date    WBC 5 14 12/21/2022    HGB 13 3 12/21/2022    HCT 41 1 12/21/2022    MCV 93 12/21/2022     12/21/2022     Lab Results   Component Value Date    SODIUM 138 12/21/2022    K 3 7 12/21/2022     12/21/2022    CO2 24 12/21/2022    AGAP 8 12/21/2022    BUN 20 12/21/2022    CREATININE 0 73 12/21/2022    GLUF 126 (H) 12/21/2022    CALCIUM 9 2 12/21/2022    AST 23 12/21/2022    ALT 34 12/21/2022    ALKPHOS 91 12/21/2022    TP 7 7 12/21/2022    TBILI 0 39 12/21/2022    EGFR 93 12/21/2022     Lab Results   Component Value Date    HGBA1C 6 0 (H) 12/21/2022     Lab Results   Component Value Date    [id number] 3 170 12/21/2022     Lab Results   Component Value Date    CHOLESTEROL 242 (H) 12/21/2022     Lab Results   Component Value Date    HDL 49 (L) 12/21/2022     Lab Results   Component Value Date    TRIG 427 (H) 12/21/2022     Lab Results   Component Value Date    [facility]  12/21/2022      Comment:      Calculated LDL invalid, triglycerides >400 mg/dl  This screening LDL is a calculated result  It does not have the accuracy of the Direct Measured LDL in the monitoring of patients with hyperlipidemia and/or statin therapy  Direct Measure LDL (GVT749) must be ordered separately in these patients

## 2023-03-31 DIAGNOSIS — R73.03 PREDIABETES: ICD-10-CM

## 2023-04-03 RX ORDER — METFORMIN HYDROCHLORIDE 500 MG/1
TABLET, EXTENDED RELEASE ORAL
Qty: 90 TABLET | Refills: 1 | Status: SHIPPED | OUTPATIENT
Start: 2023-04-03

## 2023-05-24 ENCOUNTER — OFFICE VISIT (OUTPATIENT)
Dept: BARIATRICS | Facility: CLINIC | Age: 58
End: 2023-05-24

## 2023-05-24 VITALS
OXYGEN SATURATION: 97 % | DIASTOLIC BLOOD PRESSURE: 78 MMHG | HEIGHT: 66 IN | SYSTOLIC BLOOD PRESSURE: 118 MMHG | BODY MASS INDEX: 31.4 KG/M2 | WEIGHT: 195.4 LBS | RESPIRATION RATE: 18 BRPM | HEART RATE: 83 BPM

## 2023-05-24 DIAGNOSIS — E78.2 ELEVATED TRIGLYCERIDES WITH HIGH CHOLESTEROL: ICD-10-CM

## 2023-05-24 DIAGNOSIS — R73.03 PREDIABETES: ICD-10-CM

## 2023-05-24 DIAGNOSIS — E66.09 CLASS 1 OBESITY DUE TO EXCESS CALORIES WITH SERIOUS COMORBIDITY AND BODY MASS INDEX (BMI) OF 33.0 TO 33.9 IN ADULT: Primary | ICD-10-CM

## 2023-05-24 RX ORDER — ROSUVASTATIN CALCIUM 20 MG/1
TABLET, COATED ORAL
COMMUNITY
Start: 2023-03-27

## 2023-05-24 RX ORDER — ONDANSETRON 4 MG/1
4 TABLET, ORALLY DISINTEGRATING ORAL EVERY 8 HOURS PRN
COMMUNITY
Start: 2023-05-17 | End: 2023-05-27

## 2023-05-24 RX ORDER — ALBUTEROL SULFATE 90 UG/1
2 AEROSOL, METERED RESPIRATORY (INHALATION) EVERY 4 HOURS PRN
COMMUNITY
Start: 2023-05-17

## 2023-05-24 NOTE — PROGRESS NOTES
Assessment/Plan:  Diagnoses and all orders for this visit:    Class 1 obesity due to excess calories with serious comorbidity and body mass index (BMI) of 33 0 to 33 9 in adult  -     semaglutide (Rybelsus) 3 MG tablet; Take 1 tablet (3 mg total) by mouth daily before breakfast    Prediabetes  -     semaglutide (Rybelsus) 3 MG tablet; Take 1 tablet (3 mg total) by mouth daily before breakfast    Elevated triglycerides with high cholesterol     Rybelsus will help  Cont Metformin  Discussed Phentermine in case Rybelsus not covered    ECHO 2D STRESS EXERCISE Status: Normal 2/16/2023 11:54 AM    Interpretation Summary  Negative exercise portion of exercise stress echo to heart rate achieved  Target heart rate was achieved  Echocardiographic portion of stress echo is negative for ischemia to heart rate achieved  No results found for this or any previous visit  Results for orders placed or performed in visit on 02/07/23   ECG 12-LEAD   Narrative   Result approved by Moises Dupont MD on 2/7/23     Weight not at goal  Calorie goal handouts provided:1200kcal  :   Encourage mindful eating, portion control, motivational interview performed to help patient reach goals   Patient denies personal and family history of  pancreatitis, thyroid cancer, MEN-2 tumors  Denies any hx of glaucoma, seizures, kidney stones, gallstones  Denies Hx of CAD, PAD, palpitations, arrhythmia  Denies uncontrolled anxiety or depression, suicidal ideation or behavior, insomnia or sleep disturbance  Follow up in approximately 2 mo      Subjective:   Chief Complaint   Patient presents with   • Follow-up     2 month follow up     Patient here to discuss weight associated problems and nutrition goals  HPI: Josey Carrera  is a 56 y o  female with excess weight/obesity here to pursue weight management  Patient is pursuing [facility]    Most recent notes and records were reviewed    Initial weight loss goal of 5-10% weight loss for "improved health  Wt Readings from Last 10 Encounters:   05/24/23 88 6 kg (195 lb 6 4 oz)   02/24/23 93 9 kg (207 lb)   12/21/22 95 3 kg (210 lb)   09/08/22 92 4 kg (203 lb 9 6 oz)   06/23/22 89 3 kg (196 lb 12 8 oz)   06/07/22 90 4 kg (199 lb 3 2 oz)   03/29/22 93 5 kg (206 lb 3 2 oz)   03/03/22 93 4 kg (206 lb)       Initial weight:12/21 /23  Last OV weight: 210lbs  Current weight:195lbs  Change in weight:-15lbs was able to use only 3 doses of Ozempic due to nausea      Food logging:no  Increased appetite/cravings:able to control  3 meals per day   Exercise:3 miles a day at 4 mph speed   Hydration:64oz  Alcohol:no  Sleep:ok        The following portions of the patient's history were reviewed and updated as appropriate: allergies, current medications, past family history, past medical history, past social history, past surgical history, and problem list       Review of Systems   Constitutional: Negative for activity change  Fatigue  HENT: Negative for trouble swallowing  Respiratory: Negative for shortness of breath  Cardiovascular: Negative for chest pain, edema  Gastrointestinal: Negative for abdominal pain, nausea and vomiting, acid reflux, constipation/diarrhea  Psychiatric/Behavioral: Negative for behavioral problems , anxiety or depression    Objective:  /78 (BP Location: Left arm, Patient Position: Sitting, Cuff Size: Adult)   Pulse 83   Resp 18   Ht 5' 6\" (1 676 m)   Wt 88 6 kg (195 lb 6 4 oz)   SpO2 97%   BMI 31 54 kg/m²   Constitutional: Well-developed, well-nourished and Obese Body mass index is 31 54 kg/m²  [employer] HEENT: No conjunctival injection  No thyroid masses  Pulmonary: No increased work of breathing or signs of respiratory distress  Clear respiratory sounds  CV: Well-perfused, Regular rate and rhythm, no murmurs, no edema  GI: increased abdominal girth  Non-distended  Not tender   Neuro: Oriented to person, place and time  Normal Speech  Normal gait  Psych: Normal affect and mood   No " delusion or hallucinations, normal thought process    Labs and Imaging  Recent labs and imaging have been personally reviewed  Lab Results   Component Value Date    HCT 41 1 12/21/2022    HGB 13 3 12/21/2022    MCV 93 12/21/2022     12/21/2022    WBC 5 14 12/21/2022     Lab Results   Component Value Date    AGAP 8 12/21/2022    ALKPHOS 91 12/21/2022    ALT 34 12/21/2022    AST 23 12/21/2022    BUN 20 12/21/2022    CALCIUM 9 2 12/21/2022     12/21/2022    CO2 24 12/21/2022    CREATININE 0 73 12/21/2022    EGFR 93 12/21/2022    GLUF 126 (H) 12/21/2022    K 3 7 12/21/2022    SODIUM 138 12/21/2022    TBILI 0 39 12/21/2022    TP 7 7 12/21/2022     Lab Results   Component Value Date    HGBA1C 6 0 (H) 12/21/2022     Lab Results   Component Value Date    MAK5CQKGKKZD 3 170 12/21/2022     Lab Results   Component Value Date    CHOLESTEROL 242 (H) 12/21/2022     Lab Results   Component Value Date    HDL 49 (L) 12/21/2022     Lab Results   Component Value Date    TRIG 427 (H) 12/21/2022     Lab Results   Component Value Date    [address]  12/21/2022      Comment:      Calculated LDL invalid, triglycerides >400 mg/dl  This screening LDL is a calculated result  It does not have the accuracy of the Direct Measured LDL in the monitoring of patients with hyperlipidemia and/or statin therapy  Direct Measure LDL ([id number]) must be ordered separately in these patients

## 2023-06-02 ENCOUNTER — VBI (OUTPATIENT)
Dept: ADMINISTRATIVE | Facility: OTHER | Age: 58
End: 2023-06-02

## 2023-08-14 ENCOUNTER — TELEPHONE (OUTPATIENT)
Age: 58
End: 2023-08-14

## 2023-09-18 DIAGNOSIS — R73.03 PREDIABETES: ICD-10-CM

## 2023-09-19 RX ORDER — METFORMIN HYDROCHLORIDE 500 MG/1
TABLET, EXTENDED RELEASE ORAL
Qty: 90 TABLET | Refills: 1 | Status: SHIPPED | OUTPATIENT
Start: 2023-09-19

## 2024-04-02 ENCOUNTER — TELEPHONE (OUTPATIENT)
Dept: BARIATRICS | Facility: CLINIC | Age: 59
End: 2024-04-02

## 2024-04-02 NOTE — TELEPHONE ENCOUNTER
Unable to speak or LVM for pt  due to mailbox being full to call office to schedule appointment with Dr. High to refill prescription.

## 2024-07-17 DIAGNOSIS — R73.03 PREDIABETES: ICD-10-CM

## 2024-07-18 RX ORDER — METFORMIN HYDROCHLORIDE 500 MG/1
TABLET, EXTENDED RELEASE ORAL
Qty: 90 TABLET | Refills: 0 | Status: SHIPPED | OUTPATIENT
Start: 2024-07-18